# Patient Record
Sex: FEMALE | Race: WHITE | NOT HISPANIC OR LATINO | Employment: FULL TIME | URBAN - METROPOLITAN AREA
[De-identification: names, ages, dates, MRNs, and addresses within clinical notes are randomized per-mention and may not be internally consistent; named-entity substitution may affect disease eponyms.]

---

## 2017-02-27 ENCOUNTER — GENERIC CONVERSION - ENCOUNTER (OUTPATIENT)
Dept: OTHER | Facility: OTHER | Age: 22
End: 2017-02-27

## 2017-04-04 ENCOUNTER — ALLSCRIPTS OFFICE VISIT (OUTPATIENT)
Dept: OTHER | Facility: OTHER | Age: 22
End: 2017-04-04

## 2017-08-03 ENCOUNTER — TRANSCRIBE ORDERS (OUTPATIENT)
Dept: ADMINISTRATIVE | Age: 22
End: 2017-08-03

## 2017-08-03 ENCOUNTER — APPOINTMENT (OUTPATIENT)
Dept: LAB | Age: 22
End: 2017-08-03
Payer: COMMERCIAL

## 2017-08-03 DIAGNOSIS — Z01.84 IMMUNITY STATUS TESTING: Primary | ICD-10-CM

## 2017-08-03 DIAGNOSIS — Z01.84 IMMUNITY STATUS TESTING: ICD-10-CM

## 2017-08-03 PROCEDURE — 86706 HEP B SURFACE ANTIBODY: CPT

## 2017-08-03 PROCEDURE — 86787 VARICELLA-ZOSTER ANTIBODY: CPT

## 2017-08-03 PROCEDURE — 36415 COLL VENOUS BLD VENIPUNCTURE: CPT

## 2017-08-03 PROCEDURE — 86765 RUBEOLA ANTIBODY: CPT

## 2017-08-04 LAB — HBV SURFACE AB SER-ACNC: 26.25 MIU/ML

## 2017-08-08 LAB
MEV IGG SER QL: NORMAL
VZV IGG SER IA-ACNC: NORMAL

## 2017-08-15 ENCOUNTER — TRANSCRIBE ORDERS (OUTPATIENT)
Dept: ADMINISTRATIVE | Age: 22
End: 2017-08-15

## 2017-08-15 ENCOUNTER — APPOINTMENT (OUTPATIENT)
Dept: LAB | Age: 22
End: 2017-08-15
Payer: COMMERCIAL

## 2017-08-15 DIAGNOSIS — Z01.84 IMMUNITY STATUS TESTING: ICD-10-CM

## 2017-08-15 DIAGNOSIS — Z01.84 IMMUNITY STATUS TESTING: Primary | ICD-10-CM

## 2017-08-15 PROCEDURE — 36415 COLL VENOUS BLD VENIPUNCTURE: CPT

## 2017-08-15 PROCEDURE — 86765 RUBEOLA ANTIBODY: CPT

## 2017-08-15 PROCEDURE — 86762 RUBELLA ANTIBODY: CPT

## 2017-08-15 PROCEDURE — 86735 MUMPS ANTIBODY: CPT | Performed by: PREVENTIVE MEDICINE

## 2017-08-15 PROCEDURE — 86735 MUMPS ANTIBODY: CPT

## 2017-08-15 PROCEDURE — 86765 RUBEOLA ANTIBODY: CPT | Performed by: PREVENTIVE MEDICINE

## 2017-08-16 LAB — RUBV IGG SERPL IA-ACNC: >175 IU/ML

## 2017-08-17 LAB
MEV IGG SER QL: NORMAL
MUV IGG SER QL: NORMAL

## 2017-08-22 LAB
MISCELLANEOUS LAB TEST RESULT: NORMAL
MISCELLANEOUS LAB TEST RESULT: NORMAL

## 2018-01-11 NOTE — MISCELLANEOUS
Message   Recorded as Task   Date: 02/25/2017 11:37 AM, Created By: System   Task Name: Rx Renew Request   Assigned To: Jose Luis Kowalski   Regarding Patient: Kyle Plan, Status: Active   Comment:    System - 25 Feb 2017 11:37 AM     PHARMACY: [id number]  PATIENT: Zelalem Rodriguez  MEDICATION: Romaine 24 FE Oral Tablet 1-20 MG-MCG(24)   Carey Gonzalez - 27 Feb 2017 7:24 AM     TASK REASSIGNED: Previously Assigned To Elizabeth Goodwin - 27 Feb 2017 8:59 AM     TASK EDITED  Rx to ehr        Active Problems    1  Dysmenorrhea (625 3) (N94 6)   2  Encounter for gynecological examination without abnormal finding (V72 31) (Z01 419)    Current Meds   1  Lomedia 24 FE 1-20 MG-MCG(24) Oral Tablet; TAKE ONE TABLET BY MOUTH ONCE   DAILY; Therapy: [id number] to (Evaluate:26Mar2017); Last Rx:31Mar2016 Ordered    Allergies    1  No Known Drug Allergies    Plan  Dysmenorrhea    · Lomedia 24 FE 1-20 MG-MCG(24) Oral Tablet; TAKE ONE TABLET BY MOUTH  ONCE DAILY    Signatures   Electronically signed by :  Char Harrison, ; Feb 27 2017  8:59AM EST                       (Author)

## 2018-01-12 VITALS
DIASTOLIC BLOOD PRESSURE: 70 MMHG | SYSTOLIC BLOOD PRESSURE: 100 MMHG | WEIGHT: 224 LBS | BODY MASS INDEX: 33.18 KG/M2 | HEIGHT: 69 IN

## 2018-04-23 ENCOUNTER — TELEPHONE (OUTPATIENT)
Dept: OBGYN CLINIC | Facility: CLINIC | Age: 23
End: 2018-04-23

## 2018-08-09 ENCOUNTER — ANNUAL EXAM (OUTPATIENT)
Dept: OBGYN CLINIC | Facility: CLINIC | Age: 23
End: 2018-08-09
Payer: COMMERCIAL

## 2018-08-09 VITALS
WEIGHT: 249 LBS | DIASTOLIC BLOOD PRESSURE: 66 MMHG | SYSTOLIC BLOOD PRESSURE: 122 MMHG | BODY MASS INDEX: 36.88 KG/M2 | HEIGHT: 69 IN

## 2018-08-09 DIAGNOSIS — Z01.419 ENCOUNTER FOR GYNECOLOGICAL EXAMINATION WITHOUT ABNORMAL FINDING: Primary | ICD-10-CM

## 2018-08-09 PROCEDURE — 99395 PREV VISIT EST AGE 18-39: CPT | Performed by: PHYSICIAN ASSISTANT

## 2018-08-09 RX ORDER — NORETHINDRONE ACETATE AND ETHINYL ESTRADIOL AND FERROUS FUMARATE 1MG-20(24)
1 KIT ORAL DAILY
COMMUNITY
Start: 2015-11-24 | End: 2018-08-09 | Stop reason: SDUPTHER

## 2018-08-09 RX ORDER — NORETHINDRONE ACETATE AND ETHINYL ESTRADIOL AND FERROUS FUMARATE 1MG-20(24)
1 KIT ORAL DAILY
Qty: 90 TABLET | Refills: 3 | Status: SHIPPED | OUTPATIENT
Start: 2018-08-09 | End: 2019-08-13 | Stop reason: SDUPTHER

## 2018-08-09 NOTE — PROGRESS NOTES
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  Last Pap  2016 neg      Current Outpatient Prescriptions:     norethindrone-ethinyl estradiol-ferrous fumarate (LOMEDIA 24 FE) 1-20 MG-MCG(24) per tablet, Take 1 tablet by mouth daily, Disp: , Rfl:   Social History     Social History    Marital status: Single     Spouse name: N/A    Number of children: N/A    Years of education: N/A     Occupational History    Not on file  Social History Main Topics    Smoking status: Never Smoker    Smokeless tobacco: Never Used    Alcohol use Yes      Comment: Social     Drug use: No    Sexual activity: No     Other Topics Concern    Not on file     Social History Narrative    Denied history of daily caffeinated coffee consumption    Exercises strenuously less than 3 times weekly         Family History   Problem Relation Age of Onset    Hyperthyroidism Mother     No Known Problems Father      Past Medical History:   Diagnosis Date    Known health problems: none          O:  Blood pressure 122/66, height 5' 8 5" (1 74 m), weight 113 kg (249 lb), last menstrual period 08/02/2018  Patient appears well and is not in distress  Neck is supple without masses  Breasts are symmetrical without mass, tenderness, nipple discharge, skin changes or adenopathy  Abdomen is soft and nontender without masses  External genitals are normal without lesions or rashes  Vagina is normal without discharge or bleeding  Cervix is normal without discharge or lesion  Uterus is normal, mobile, nontender without palpable mass  Adnexa are normal, nontender, without palpable mass  A:  Yearly exam      P:   Nicki Caraballo 24 sent to pharmacy   Pap 2019    RTO one year for yearly exam or sooner as needed

## 2019-08-13 ENCOUNTER — ANNUAL EXAM (OUTPATIENT)
Dept: OBGYN CLINIC | Facility: CLINIC | Age: 24
End: 2019-08-13
Payer: COMMERCIAL

## 2019-08-13 VITALS
BODY MASS INDEX: 35.31 KG/M2 | WEIGHT: 238.4 LBS | HEIGHT: 69 IN | DIASTOLIC BLOOD PRESSURE: 80 MMHG | SYSTOLIC BLOOD PRESSURE: 122 MMHG

## 2019-08-13 DIAGNOSIS — Z01.419 ENCOUNTER FOR GYNECOLOGICAL EXAMINATION WITHOUT ABNORMAL FINDING: ICD-10-CM

## 2019-08-13 DIAGNOSIS — Z01.419 ENCNTR FOR GYN EXAM (GENERAL) (ROUTINE) W/O ABN FINDINGS: Primary | ICD-10-CM

## 2019-08-13 PROCEDURE — 87624 HPV HI-RISK TYP POOLED RSLT: CPT | Performed by: PHYSICIAN ASSISTANT

## 2019-08-13 PROCEDURE — 99395 PREV VISIT EST AGE 18-39: CPT | Performed by: PHYSICIAN ASSISTANT

## 2019-08-13 PROCEDURE — G0145 SCR C/V CYTO,THINLAYER,RESCR: HCPCS | Performed by: PATHOLOGY

## 2019-08-13 PROCEDURE — G0124 SCREEN C/V THIN LAYER BY MD: HCPCS | Performed by: PATHOLOGY

## 2019-08-13 RX ORDER — NORETHINDRONE ACETATE AND ETHINYL ESTRADIOL AND FERROUS FUMARATE 1MG-20(24)
1 KIT ORAL DAILY
Qty: 90 TABLET | Refills: 3 | Status: SHIPPED | OUTPATIENT
Start: 2019-08-13 | End: 2020-09-15 | Stop reason: SDUPTHER

## 2019-08-13 NOTE — PROGRESS NOTES
Robert Hem  1995    CC:  Yearly exam    S:  20 y o  female here for yearly exam  Her cycles are regular, not heavy or crampy  She has never been sexually active  She uses Lomedia 24 for cycle control  She does not request STD testing today  We reviewed [facility] guidelines for Pap testing       Last Pap 2016 neg    Family hx of breast cancer: no  Family hx of ovarian cancer:  no  Family hx of colon cancer: no      Current Outpatient Medications:     norethindrone-ethinyl estradiol-ferrous fumarate (LOMEDIA 24 FE) 1-20 MG-MCG(24) per tablet, Take 1 tablet by mouth daily, Disp: 90 tablet, Rfl: 3  Social History     Socioeconomic History    Marital status: Single     Spouse name: Not on file    Number of children: Not on file    Years of education: Not on file    Highest education level: Not on file   Occupational History    Not on file   Social Needs    Financial resource strain: Not on file    Food insecurity:     Worry: Not on file     Inability: Not on file    Transportation needs:     Medical: Not on file     Non-medical: Not on file   Tobacco Use    Smoking status: Never Smoker    Smokeless tobacco: Never Used   Substance and Sexual Activity    Alcohol use: Yes     Comment: Social     Drug use: No    Sexual activity: Never     Birth control/protection: Pill   Lifestyle    Physical activity:     Days per week: Not on file     Minutes per session: Not on file    Stress: Not on file   Relationships    Social connections:     Talks on phone: Not on file     Gets together: Not on file     Attends Pentecostalism service: Not on file     Active member of club or organization: Not on file     Attends meetings of clubs or organizations: Not on file     Relationship status: Not on file    Intimate partner violence:     Fear of current or ex partner: Not on file     Emotionally abused: Not on file     Physically abused: Not on file     Forced sexual activity: Not on file   Other Topics Concern    Not on file   Social History Narrative    Denied history of daily caffeinated coffee consumption    Exercises strenuously less than 3 times weekly     Family History   Problem Relation Age of Onset    Hyperthyroidism Mother     No Known Problems Father      Past Medical History:   Diagnosis Date    Known health problems: none        Review of Systems   Respiratory: Negative  Cardiovascular: Negative  Gastrointestinal: Negative for constipation and diarrhea  Genitourinary: Negative for difficulty urinating, pelvic pain, vaginal bleeding, vaginal discharge, itching or odor  O:  Blood pressure 122/80, height 5' 9" (1 753 m), weight 108 kg (238 lb 6 4 oz), last menstrual period 08/06/2019  Patient appears well and is not in distress  Neck is supple without masses  Breasts are symmetrical without mass, tenderness, nipple discharge, skin changes or adenopathy  Abdomen is soft and nontender without masses  External genitals are normal without lesions or rashes  Urethra and urethral meatus are normal  Bladder is normal to palpation  Vagina is normal without discharge or bleeding  Cervix is normal without discharge or lesion  Uterus is normal, mobile, nontender without palpable mass  Adnexa are normal, nontender, without palpable mass  A:  Yearly exam      P:   Pap with reflex HPV today    [facility] 24 sent to pharmacy     RTO one year for yearly exam or sooner as needed

## 2019-08-18 LAB
LAB AP GYN PRIMARY INTERPRETATION: ABNORMAL
Lab: ABNORMAL
PATH INTERP SPEC-IMP: ABNORMAL

## 2019-08-19 LAB
HPV HR 12 DNA CVX QL NAA+PROBE: NEGATIVE
HPV16 DNA CVX QL NAA+PROBE: NEGATIVE
HPV18 DNA CVX QL NAA+PROBE: NEGATIVE

## 2019-08-20 ENCOUNTER — TELEPHONE (OUTPATIENT)
Dept: OBGYN CLINIC | Facility: CLINIC | Age: 24
End: 2019-08-20

## 2019-08-20 NOTE — TELEPHONE ENCOUNTER
----- Message from Travis Medley PA-C sent at 8/20/2019  9:35 AM EDT -----  Pap shows atypical cells but HPV test is negative - this is considered a normal Pap  Will repeat her Pap in 3 years

## 2019-08-23 ENCOUNTER — APPOINTMENT (OUTPATIENT)
Dept: LAB | Age: 24
End: 2019-08-23
Attending: PREVENTIVE MEDICINE

## 2019-08-23 ENCOUNTER — TRANSCRIBE ORDERS (OUTPATIENT)
Dept: ADMINISTRATIVE | Age: 24
End: 2019-08-23

## 2019-08-23 DIAGNOSIS — Z02.1 PHYSICAL EXAM, PRE-EMPLOYMENT: Primary | ICD-10-CM

## 2019-08-23 DIAGNOSIS — Z02.1 PHYSICAL EXAM, PRE-EMPLOYMENT: ICD-10-CM

## 2019-08-23 PROCEDURE — 36415 COLL VENOUS BLD VENIPUNCTURE: CPT

## 2019-08-23 PROCEDURE — 86480 TB TEST CELL IMMUN MEASURE: CPT

## 2019-08-26 LAB
GAMMA INTERFERON BACKGROUND BLD IA-ACNC: 0.03 IU/ML
M TB IFN-G BLD-IMP: NEGATIVE
M TB IFN-G CD4+ BCKGRND COR BLD-ACNC: -0.01 IU/ML
M TB IFN-G CD4+ BCKGRND COR BLD-ACNC: 0 IU/ML
MITOGEN IGNF BCKGRD COR BLD-ACNC: >10 IU/ML

## 2020-03-19 ENCOUNTER — APPOINTMENT (OUTPATIENT)
Dept: LAB | Facility: HOSPITAL | Age: 25
End: 2020-03-19
Payer: COMMERCIAL

## 2020-03-19 ENCOUNTER — TRANSCRIBE ORDERS (OUTPATIENT)
Dept: LAB | Facility: HOSPITAL | Age: 25
End: 2020-03-19

## 2020-03-19 DIAGNOSIS — Z00.8 HEALTH EXAMINATION IN POPULATION SURVEY: Primary | ICD-10-CM

## 2020-03-19 DIAGNOSIS — Z00.8 HEALTH EXAMINATION IN POPULATION SURVEY: ICD-10-CM

## 2020-03-19 LAB
CHOLEST SERPL-MCNC: 151 MG/DL (ref 50–200)
EST. AVERAGE GLUCOSE BLD GHB EST-MCNC: 111 MG/DL
HBA1C MFR BLD: 5.5 %
HDLC SERPL-MCNC: 46 MG/DL
LDLC SERPL CALC-MCNC: 74 MG/DL (ref 0–100)
NONHDLC SERPL-MCNC: 105 MG/DL
TRIGL SERPL-MCNC: 155 MG/DL

## 2020-03-19 PROCEDURE — 36415 COLL VENOUS BLD VENIPUNCTURE: CPT

## 2020-03-19 PROCEDURE — 83036 HEMOGLOBIN GLYCOSYLATED A1C: CPT

## 2020-03-19 PROCEDURE — 80061 LIPID PANEL: CPT

## 2020-09-15 ENCOUNTER — ANNUAL EXAM (OUTPATIENT)
Dept: OBGYN CLINIC | Facility: CLINIC | Age: 25
End: 2020-09-15
Payer: COMMERCIAL

## 2020-09-15 VITALS
DIASTOLIC BLOOD PRESSURE: 80 MMHG | SYSTOLIC BLOOD PRESSURE: 120 MMHG | HEIGHT: 69 IN | TEMPERATURE: 99.2 F | BODY MASS INDEX: 34.36 KG/M2 | WEIGHT: 232 LBS

## 2020-09-15 DIAGNOSIS — Z01.419 ENCOUNTER FOR GYNECOLOGICAL EXAMINATION WITHOUT ABNORMAL FINDING: ICD-10-CM

## 2020-09-15 DIAGNOSIS — Z01.419 ENCNTR FOR GYN EXAM (GENERAL) (ROUTINE) W/O ABN FINDINGS: Primary | ICD-10-CM

## 2020-09-15 PROCEDURE — 99395 PREV VISIT EST AGE 18-39: CPT | Performed by: PHYSICIAN ASSISTANT

## 2020-09-15 NOTE — PROGRESS NOTES
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[employer]/[place]      We reviewed [facility] guidelines for Pap testing       Last Pap 8/13/19 ASCUS neg HPV     Family hx of breast cancer: no  Family hx of ovarian cancer: no  Family hx of colon cancer: no      Current Outpatient Medications:     norethindrone-ethinyl estradiol-ferrous fumarate (LOMEDIA 24 FE) 1-20 MG-MCG(24) per tablet, Take 1 tablet by mouth daily, Disp: 90 tablet, Rfl: 3  Social History     Socioeconomic History    Marital status: Single     Spouse name: Not on file    Number of children: Not on file    Years of education: Not on file    Highest education level: Not on file   Occupational History    Not on file   Social Needs    Financial resource strain: Not on file    Food insecurity     Worry: Not on file     Inability: Not on file    Transportation needs     Medical: Not on file     Non-medical: Not on file   Tobacco Use    Smoking status: Never Smoker    Smokeless tobacco: Never Used   Substance and Sexual Activity    Alcohol use: Yes     Frequency: Monthly or less     Drinks per session: 1 or 2     Binge frequency: Never     Comment: Social     Drug use: No    Sexual activity: Never     Birth control/protection: Pill   Lifestyle    Physical activity     Days per week: Not on file     Minutes per session: Not on file    Stress: Not on file   Relationships    Social connections     Talks on phone: Not on file     Gets together: Not on file     Attends Nondenominational service: Not on file     Active member of club or organization: Not on file Attends meetings of clubs or organizations: Not on file     Relationship status: Not on file    Intimate partner violence     Fear of current or ex partner: Not on file     Emotionally abused: Not on file     Physically abused: Not on file     Forced sexual activity: Not on file   Other Topics Concern    Not on file   Social History Narrative    Denied history of daily caffeinated coffee consumption    Exercises strenuously less than 3 times weekly     Family History   Problem Relation Age of Onset    Hyperthyroidism Mother     No Known Problems Father      Past Medical History:   Diagnosis Date    Known health problems: none        Review of Systems   Respiratory: Negative  Cardiovascular: Negative  Gastrointestinal: Negative for constipation and diarrhea  Genitourinary: Negative for difficulty urinating, pelvic pain, vaginal bleeding, vaginal discharge, itching or odor  O:  Blood pressure 120/80, temperature 99 2 °F (37 3 °C), temperature source Tympanic, height 5' 8 5" (1 74 m), weight 105 kg (232 lb), last menstrual period 09/05/2020  Patient appears well and is not in distress  Neck is supple without masses  Breasts are symmetrical without mass, tenderness, nipple discharge, skin changes or adenopathy  Abdomen is soft and nontender without masses  Pelvic exam deferred - asymptomatic, never s/a    A:  Yearly exam      P:   Pap 2022   Lomedia 24 sent to pharmacy     RTO one year for yearly exam or sooner as needed

## 2020-11-23 ENCOUNTER — TELEPHONE (OUTPATIENT)
Dept: FAMILY MEDICINE CLINIC | Facility: CLINIC | Age: 25
End: 2020-11-23

## 2020-12-18 ENCOUNTER — IMMUNIZATIONS (OUTPATIENT)
Dept: FAMILY MEDICINE CLINIC | Facility: HOSPITAL | Age: 25
End: 2020-12-18
Payer: COMMERCIAL

## 2020-12-18 DIAGNOSIS — Z23 ENCOUNTER FOR IMMUNIZATION: ICD-10-CM

## 2020-12-18 PROCEDURE — 91300 SARS-COV-2 / COVID-19 MRNA VACCINE (PFIZER-BIONTECH) 30 MCG: CPT

## 2020-12-18 PROCEDURE — 0001A SARS-COV-2 / COVID-19 MRNA VACCINE (PFIZER-BIONTECH) 30 MCG: CPT

## 2021-01-06 ENCOUNTER — TELEPHONE (OUTPATIENT)
Dept: INTERNAL MEDICINE CLINIC | Facility: CLINIC | Age: 26
End: 2021-01-06

## 2021-01-07 ENCOUNTER — IMMUNIZATIONS (OUTPATIENT)
Dept: FAMILY MEDICINE CLINIC | Facility: HOSPITAL | Age: 26
End: 2021-01-07

## 2021-01-07 DIAGNOSIS — Z23 ENCOUNTER FOR IMMUNIZATION: ICD-10-CM

## 2021-01-07 PROCEDURE — 0002A SARS-COV-2 / COVID-19 MRNA VACCINE (PFIZER-BIONTECH) 30 MCG: CPT

## 2021-01-07 PROCEDURE — 91300 SARS-COV-2 / COVID-19 MRNA VACCINE (PFIZER-BIONTECH) 30 MCG: CPT

## 2021-01-20 ENCOUNTER — OFFICE VISIT (OUTPATIENT)
Dept: INTERNAL MEDICINE CLINIC | Facility: CLINIC | Age: 26
End: 2021-01-20
Payer: COMMERCIAL

## 2021-01-20 VITALS
OXYGEN SATURATION: 99 % | DIASTOLIC BLOOD PRESSURE: 84 MMHG | SYSTOLIC BLOOD PRESSURE: 130 MMHG | BODY MASS INDEX: 35.25 KG/M2 | TEMPERATURE: 96.2 F | WEIGHT: 238 LBS | HEART RATE: 82 BPM | HEIGHT: 69 IN

## 2021-01-20 DIAGNOSIS — F41.9 ANXIETY: ICD-10-CM

## 2021-01-20 DIAGNOSIS — L29.9 ITCHY SCALP: ICD-10-CM

## 2021-01-20 DIAGNOSIS — L65.9 HAIR LOSS: ICD-10-CM

## 2021-01-20 DIAGNOSIS — Z00.00 ANNUAL PHYSICAL EXAM: Primary | ICD-10-CM

## 2021-01-20 PROCEDURE — 99385 PREV VISIT NEW AGE 18-39: CPT | Performed by: NURSE PRACTITIONER

## 2021-01-20 RX ORDER — KETOCONAZOLE 20 MG/ML
1 SHAMPOO TOPICAL 2 TIMES WEEKLY
Qty: 120 ML | Refills: 1 | Status: SHIPPED | OUTPATIENT
Start: 2021-01-21 | End: 2021-12-08 | Stop reason: SDUPTHER

## 2021-01-20 NOTE — PROGRESS NOTES
Assessment/Plan:    No problem-specific Assessment & Plan notes found for this encounter  There are no diagnoses linked to this encounter  Subjective:      Patient ID: Clark Mayer is a 30 y o  female      Here today for [facility] care    Wants to see derm   Hair loss, over the last few years, over the last 6 months worse         The following portions of the patient's history were reviewed and updated as appropriate: allergies, current medications, past family history, past medical history, past social history, past surgical history and problem list     Review of Systems      Objective:      /84   Pulse 82   Temp (!) 96 2 °F (35 7 °C)   Ht 5' 8 5" (1 74 m)   Wt 108 kg (238 lb)   SpO2 99%   BMI 35 66 kg/m²          Physical Exam

## 2021-01-20 NOTE — PROGRESS NOTES
[address] INTERNAL MEDICINE    NAME: Gopi Coe  AGE: 30 y o  SEX: female  : 1995     DATE: 2021     Assessment and Plan:     Problem List Items Addressed This Visit        Other    Anxiety     Stable  In counseling  Has not been on medication in the past             Other Visit Diagnoses     Annual physical exam    -  Primary    Relevant Orders    Comprehensive metabolic panel    Lipid Panel with Direct LDL reflex    Itchy scalp        Relevant Medications    ketoconazole (NIZORAL) 2 % shampoo (Start on 2021)    Other Relevant Orders    Ambulatory referral to Dermatology    Hair loss        check labs    Relevant Orders    Ambulatory referral to Dermatology    TSH, 3rd generation with Free T4 reflex    CBC and differential          Immunizations and preventive care screenings were discussed with patient today  Appropriate education was printed on patient's after visit summary  Counseling:  Injury prevention: discussed safety/seat belts, safety helmets, smoke detectors, carbon dioxide detectors, and smoking near bedding or upholstery  · Exercise: the importance of regular exercise/physical activity was discussed  Recommend exercise 3-5 times per week for at least 30 minutes  BMI Counseling: Body mass index is 35 66 kg/m²  The BMI is above normal  Nutrition recommendations include decreasing portion sizes and encouraging healthy choices of fruits and vegetables  Exercise recommendations include exercising 3-5 times per week  Return in about 1 year (around 2022) for Next scheduled follow up       Chief Complaint:     Chief Complaint   Patient presents with    SLP Initial Evaluation     est, hair loss x3 years       History of Present Illness:     Adult Annual Physical   Patient here for a comprehensive physical exam  The patient reports problems - Hair loss over the last few years, worse over the last 10 months  occasional lightheadedness over the last few years  Very prone to motion sickness     She has a history of psoriasis of her scalp  She has not seen dermatology  Recently her scalp has been more itchy  Diet and Physical Activity  · Diet/Nutrition: well balanced diet  · Exercise: no formal exercise and rides horses   Depression Screening  PHQ-9 Depression Screening    PHQ-9:   Frequency of the following problems over the past two weeks:      Little interest or pleasure in doing things: 0 - not at all  Feeling down, depressed, or hopeless: 0 - not at all  PHQ-2 Score: 0       General Health  · Sleep: sleeps well  · Hearing: normal - none   · Vision: goes for regular eye exams, wears glasses and wears contacts  · Dental: regular dental visits  /GYN Health  · Last menstrual period: regular   · Contraceptive method: oral contraceptives  · Last pap: 2019, normal      Review of Systems:     Review of Systems   Constitutional: Negative for activity change, appetite change, fatigue and unexpected weight change  Eyes: Negative for visual disturbance  Respiratory: Negative for cough, chest tightness and shortness of breath  Cardiovascular: Negative for chest pain, palpitations and leg swelling  Gastrointestinal: Negative for abdominal pain, constipation and diarrhea  Endocrine:        Hair loss    Genitourinary: Negative for difficulty urinating  Musculoskeletal: Negative for arthralgias  Skin: Negative for rash  Itchy scalp   Neurological: Positive for light-headedness  Negative for dizziness, weakness and headaches  Psychiatric/Behavioral: Negative for sleep disturbance  Past Medical History:     Past Medical History:   Diagnosis Date    Allergic     Anxiety     Known health problems: none       Past Surgical History:     History reviewed  No pertinent surgical history     Social History:     E-Cigarette/Vaping    E-Cigarette Use Never User E-Cigarette/Vaping Substances    Nicotine No     THC No     CBD No     Flavoring No     Other No     Unknown No      Social History     Socioeconomic History    Marital status: Single     Spouse name: None    Number of children: None    Years of education: None    Highest education level: None   Occupational History    None   Social Needs    Financial resource strain: None    Food insecurity     Worry: None     Inability: None    Transportation needs     Medical: None     Non-medical: None   Tobacco Use    Smoking status: Never Smoker    Smokeless tobacco: Never Used   Substance and Sexual Activity    Alcohol use: Yes     Frequency: Monthly or less     Drinks per session: 1 or 2     Binge frequency: Never     Comment: Social     Drug use: No    Sexual activity: Never     Birth control/protection: Pill   Lifestyle    Physical activity     Days per week: None     Minutes per session: None    Stress: None   Relationships    Social connections     Talks on phone: None     Gets together: None     Attends Mu-ism service: None     Active member of club or organization: None     Attends meetings of clubs or organizations: None     Relationship status: None    Intimate partner violence     Fear of current or ex partner: None     Emotionally abused: None     Physically abused: None     Forced sexual activity: None   Other Topics Concern    None   Social History Narrative    Denied history of daily caffeinated coffee consumption    Exercises strenuously less than 3 times weekly      Family History:     Family History   Problem Relation Age of Onset    Hyperthyroidism Mother         when pregnant     No Known Problems Father     Cancer Maternal Grandmother         uterine     Cancer Maternal Aunt         melanoma    Alcohol abuse Neg Hx     Substance Abuse Neg Hx     Mental illness Neg Hx     Depression Neg Hx       Current Medications:     Current Outpatient Medications   Medication Sig Dispense Refill    [START ON 1/21/2021] ketoconazole (NIZORAL) 2 % shampoo Apply 1 application topically 2 (two) times a week 120 mL 1    norethindrone-ethinyl estradiol-ferrous fumarate (Lomedia 24 FE) 1-20 MG-MCG(24) per tablet Take 1 tablet by mouth daily 90 tablet 3     No current facility-administered medications for this visit  Allergies:     No Known Allergies   Physical Exam:     /84   Pulse 82   Temp (!) 96 2 °F (35 7 °C)   Ht 5' 8 5" (1 74 m)   Wt 108 kg (238 lb)   SpO2 99%   BMI 35 66 kg/m²     Physical Exam  Vitals signs reviewed  Constitutional:       General: She is not in acute distress  Appearance: Normal appearance  HENT:      Head: Normocephalic and atraumatic  Right Ear: Tympanic membrane, ear canal and external ear normal       Left Ear: Tympanic membrane, ear canal and external ear normal    Eyes:      Conjunctiva/sclera: Conjunctivae normal       Pupils: Pupils are equal, round, and reactive to light  Neck:      Musculoskeletal: Normal range of motion  Cardiovascular:      Rate and Rhythm: Normal rate and regular rhythm  Pulses: Normal pulses  Heart sounds: Normal heart sounds  Pulmonary:      Effort: Pulmonary effort is normal       Breath sounds: Normal breath sounds  Abdominal:      General: Bowel sounds are normal       Palpations: Abdomen is soft  Musculoskeletal: Normal range of motion  General: No swelling  Lymphadenopathy:      Cervical: No cervical adenopathy  Skin:     General: Skin is warm and dry  Neurological:      Mental Status: She is alert and oriented to person, place, and time     Psychiatric:         Mood and Affect: Mood normal          Behavior: Behavior normal           [facility] [address] INTERNAL MEDICINE

## 2021-01-26 ENCOUNTER — LAB (OUTPATIENT)
Dept: LAB | Age: 26
End: 2021-01-26
Payer: COMMERCIAL

## 2021-01-26 DIAGNOSIS — Z00.00 ANNUAL PHYSICAL EXAM: ICD-10-CM

## 2021-01-26 DIAGNOSIS — L65.9 HAIR LOSS: ICD-10-CM

## 2021-01-26 DIAGNOSIS — F41.9 ANXIETY: Primary | ICD-10-CM

## 2021-01-26 LAB
ALBUMIN SERPL BCP-MCNC: 3.5 G/DL (ref 3.5–5)
ALP SERPL-CCNC: 78 U/L (ref 46–116)
ALT SERPL W P-5'-P-CCNC: 23 U/L (ref 12–78)
ANION GAP SERPL CALCULATED.3IONS-SCNC: 2 MMOL/L (ref 4–13)
AST SERPL W P-5'-P-CCNC: 17 U/L (ref 5–45)
BASOPHILS # BLD AUTO: 0.09 THOUSANDS/ΜL (ref 0–0.1)
BASOPHILS NFR BLD AUTO: 2 % (ref 0–1)
BILIRUB SERPL-MCNC: 0.35 MG/DL (ref 0.2–1)
BUN SERPL-MCNC: 11 MG/DL (ref 5–25)
CALCIUM SERPL-MCNC: 8.8 MG/DL (ref 8.3–10.1)
CHLORIDE SERPL-SCNC: 105 MMOL/L (ref 100–108)
CHOLEST SERPL-MCNC: 169 MG/DL (ref 50–200)
CO2 SERPL-SCNC: 31 MMOL/L (ref 21–32)
CREAT SERPL-MCNC: 0.79 MG/DL (ref 0.6–1.3)
EOSINOPHIL # BLD AUTO: 0.29 THOUSAND/ΜL (ref 0–0.61)
EOSINOPHIL NFR BLD AUTO: 5 % (ref 0–6)
ERYTHROCYTE [DISTWIDTH] IN BLOOD BY AUTOMATED COUNT: 13.1 % (ref 11.6–15.1)
GFR SERPL CREATININE-BSD FRML MDRD: 103 ML/MIN/1.73SQ M
GLUCOSE P FAST SERPL-MCNC: 93 MG/DL (ref 65–99)
HCT VFR BLD AUTO: 39.7 % (ref 34.8–46.1)
HDLC SERPL-MCNC: 53 MG/DL
HGB BLD-MCNC: 12.7 G/DL (ref 11.5–15.4)
IMM GRANULOCYTES # BLD AUTO: 0.01 THOUSAND/UL (ref 0–0.2)
IMM GRANULOCYTES NFR BLD AUTO: 0 % (ref 0–2)
LDLC SERPL CALC-MCNC: 86 MG/DL (ref 0–100)
LYMPHOCYTES # BLD AUTO: 1.76 THOUSANDS/ΜL (ref 0.6–4.47)
LYMPHOCYTES NFR BLD AUTO: 30 % (ref 14–44)
MCH RBC QN AUTO: 28.5 PG (ref 26.8–34.3)
MCHC RBC AUTO-ENTMCNC: 32 G/DL (ref 31.4–37.4)
MCV RBC AUTO: 89 FL (ref 82–98)
MONOCYTES # BLD AUTO: 0.37 THOUSAND/ΜL (ref 0.17–1.22)
MONOCYTES NFR BLD AUTO: 6 % (ref 4–12)
NEUTROPHILS # BLD AUTO: 3.42 THOUSANDS/ΜL (ref 1.85–7.62)
NEUTS SEG NFR BLD AUTO: 57 % (ref 43–75)
NRBC BLD AUTO-RTO: 0 /100 WBCS
PLATELET # BLD AUTO: 362 THOUSANDS/UL (ref 149–390)
PMV BLD AUTO: 9.5 FL (ref 8.9–12.7)
POTASSIUM SERPL-SCNC: 4.5 MMOL/L (ref 3.5–5.3)
PROT SERPL-MCNC: 7.5 G/DL (ref 6.4–8.2)
RBC # BLD AUTO: 4.46 MILLION/UL (ref 3.81–5.12)
SODIUM SERPL-SCNC: 138 MMOL/L (ref 136–145)
TRIGL SERPL-MCNC: 149 MG/DL
TSH SERPL DL<=0.05 MIU/L-ACNC: 2.79 UIU/ML (ref 0.36–3.74)
WBC # BLD AUTO: 5.94 THOUSAND/UL (ref 4.31–10.16)

## 2021-01-26 PROCEDURE — 84443 ASSAY THYROID STIM HORMONE: CPT

## 2021-01-26 PROCEDURE — 85025 COMPLETE CBC W/AUTO DIFF WBC: CPT

## 2021-01-26 PROCEDURE — 80053 COMPREHEN METABOLIC PANEL: CPT

## 2021-01-26 PROCEDURE — 80061 LIPID PANEL: CPT

## 2021-01-26 PROCEDURE — 36415 COLL VENOUS BLD VENIPUNCTURE: CPT

## 2021-01-26 RX ORDER — ESCITALOPRAM OXALATE 5 MG/1
5 TABLET ORAL DAILY
Qty: 30 TABLET | Refills: 0 | Status: SHIPPED | OUTPATIENT
Start: 2021-01-26 | End: 2021-02-25 | Stop reason: SDUPTHER

## 2021-02-25 ENCOUNTER — OFFICE VISIT (OUTPATIENT)
Dept: INTERNAL MEDICINE CLINIC | Facility: CLINIC | Age: 26
End: 2021-02-25
Payer: COMMERCIAL

## 2021-02-25 VITALS
TEMPERATURE: 97 F | DIASTOLIC BLOOD PRESSURE: 82 MMHG | BODY MASS INDEX: 35.01 KG/M2 | WEIGHT: 236.4 LBS | HEART RATE: 82 BPM | SYSTOLIC BLOOD PRESSURE: 118 MMHG | OXYGEN SATURATION: 99 % | HEIGHT: 69 IN

## 2021-02-25 DIAGNOSIS — F41.9 ANXIETY: ICD-10-CM

## 2021-02-25 PROCEDURE — 99213 OFFICE O/P EST LOW 20 MIN: CPT | Performed by: NURSE PRACTITIONER

## 2021-02-25 RX ORDER — ESCITALOPRAM OXALATE 10 MG/1
10 TABLET ORAL DAILY
Qty: 30 TABLET | Refills: 0 | Status: SHIPPED | OUTPATIENT
Start: 2021-02-25 | End: 2021-03-25 | Stop reason: SDUPTHER

## 2021-02-25 NOTE — PROGRESS NOTES
Assessment/Plan:    Anxiety  Increase lexapro to 10 mg daily  Continue counseling  Reevaluate in 1 month  Diagnoses and all orders for this visit:    Anxiety  -     escitalopram (LEXAPRO) 10 mg tablet; Take 1 tablet (10 mg total) by mouth daily          Subjective:      Patient ID: Jony Gibbs is a 30 y o  female  Here today for follow up anxiety   Priscilla Upton was started on lexapro 1 month ago  She had mild headache and nausea in the beginning which have passed  Her anxiety has somewhat improve although she still has some anxiety  She has continued in counseling every other week  The following portions of the patient's history were reviewed and updated as appropriate: allergies, current medications, past family history, past medical history, past social history, past surgical history and problem list     Review of Systems   Constitutional: Negative for activity change, appetite change and fatigue  Respiratory: Negative for shortness of breath  Cardiovascular: Negative for chest pain  Gastrointestinal: Negative for abdominal pain, diarrhea, nausea and vomiting  Neurological: Negative for dizziness, light-headedness and headaches  Psychiatric/Behavioral: Negative for decreased concentration, dysphoric mood, sleep disturbance and suicidal ideas  The patient is nervous/anxious  Objective:      /82   Pulse 82   Temp (!) 97 °F (36 1 °C)   Ht 5' 8 5" (1 74 m)   Wt 107 kg (236 lb 6 4 oz)   SpO2 99%   BMI 35 42 kg/m²          Physical Exam  Vitals signs reviewed  Constitutional:       Appearance: Normal appearance  HENT:      Head: Normocephalic and atraumatic  Eyes:      Conjunctiva/sclera: Conjunctivae normal       Pupils: Pupils are equal, round, and reactive to light  Pulmonary:      Effort: Pulmonary effort is normal    Skin:     General: Skin is warm and dry  Neurological:      Mental Status: She is alert and oriented to person, place, and time  Psychiatric:         Mood and Affect: Mood normal          Behavior: Behavior normal

## 2021-03-25 ENCOUNTER — TELEMEDICINE (OUTPATIENT)
Dept: INTERNAL MEDICINE CLINIC | Facility: CLINIC | Age: 26
End: 2021-03-25
Payer: COMMERCIAL

## 2021-03-25 DIAGNOSIS — F41.9 ANXIETY: Primary | ICD-10-CM

## 2021-03-25 DIAGNOSIS — F41.9 ANXIETY: ICD-10-CM

## 2021-03-25 PROCEDURE — 99213 OFFICE O/P EST LOW 20 MIN: CPT | Performed by: NURSE PRACTITIONER

## 2021-03-26 RX ORDER — ESCITALOPRAM OXALATE 10 MG/1
10 TABLET ORAL DAILY
Qty: 90 TABLET | Refills: 0 | Status: SHIPPED | OUTPATIENT
Start: 2021-03-26 | End: 2021-06-25 | Stop reason: SDUPTHER

## 2021-06-25 DIAGNOSIS — F41.9 ANXIETY: ICD-10-CM

## 2021-06-28 RX ORDER — ESCITALOPRAM OXALATE 10 MG/1
10 TABLET ORAL DAILY
Qty: 90 TABLET | Refills: 0 | Status: SHIPPED | OUTPATIENT
Start: 2021-06-28 | End: 2021-09-23 | Stop reason: SDUPTHER

## 2021-09-08 ENCOUNTER — APPOINTMENT (OUTPATIENT)
Dept: LAB | Facility: HOSPITAL | Age: 26
End: 2021-09-08

## 2021-09-08 DIAGNOSIS — Z00.8 ENCOUNTER FOR OTHER GENERAL EXAMINATION: ICD-10-CM

## 2021-09-08 LAB
CHOLEST SERPL-MCNC: 165 MG/DL (ref 50–200)
EST. AVERAGE GLUCOSE BLD GHB EST-MCNC: 117 MG/DL
HBA1C MFR BLD: 5.7 %
HDLC SERPL-MCNC: 50 MG/DL
LDLC SERPL CALC-MCNC: 74 MG/DL (ref 0–100)
NONHDLC SERPL-MCNC: 115 MG/DL
TRIGL SERPL-MCNC: 205 MG/DL

## 2021-09-08 PROCEDURE — 83036 HEMOGLOBIN GLYCOSYLATED A1C: CPT

## 2021-09-08 PROCEDURE — 80061 LIPID PANEL: CPT

## 2021-09-08 PROCEDURE — 36415 COLL VENOUS BLD VENIPUNCTURE: CPT

## 2021-09-23 DIAGNOSIS — F41.9 ANXIETY: ICD-10-CM

## 2021-09-23 RX ORDER — ESCITALOPRAM OXALATE 10 MG/1
10 TABLET ORAL DAILY
Qty: 90 TABLET | Refills: 0 | Status: SHIPPED | OUTPATIENT
Start: 2021-09-23 | End: 2021-12-27 | Stop reason: SDUPTHER

## 2021-09-27 ENCOUNTER — TELEMEDICINE (OUTPATIENT)
Dept: INTERNAL MEDICINE CLINIC | Facility: CLINIC | Age: 26
End: 2021-09-27
Payer: COMMERCIAL

## 2021-09-27 DIAGNOSIS — F41.9 ANXIETY: Primary | ICD-10-CM

## 2021-09-27 PROCEDURE — 99213 OFFICE O/P EST LOW 20 MIN: CPT | Performed by: NURSE PRACTITIONER

## 2021-09-27 NOTE — PROGRESS NOTES
Virtual Regular Visit    Verification of patient location:    Patient is located in the following state in which I hold an active license [place]      Assessment/Plan:    Problem List Items Addressed This Visit        Other    Anxiety - Primary     Stable  Continue lexapro and counseling  Reason for visit is   Chief Complaint   Patient presents with    Follow-up        Encounter provider PRETTY Robbins    Provider located at [address] [place] [address] [place] [id number]      Recent Visits  No visits were found meeting these conditions  Showing recent visits within past 7 days and meeting all other requirements  Today's Visits  Date Type Provider Dept   09/27/21 [address] Internal Med   Showing today's visits and meeting all other requirements  Future Appointments  No visits were found meeting these conditions  Showing future appointments within next 150 days and meeting all other requirements       The patient was identified by name and date of birth  Lexis Lisa was informed that this is a telemedicine visit and that the visit is being conducted through [address] [place] [id number] Now and patient was informed that this is a secure, HIPAA-compliant platform  She agrees to proceed     My office door was closed  No one else was in the room  She acknowledged consent and understanding of privacy and security of the video platform  The patient has agreed to participate and understands they can discontinue the visit at any time  Patient is aware this is a billable service  Subjective  Lexis Lisa is a 30 y o  female follow up anxiety  Yadira Whitt is doing well on lexapro  She has been stable on current dose for several months  She denies any side effects  Her anxiety is well managed  She is still in counseling as well  She has no new concerns today         Past Medical History:   Diagnosis Date    Allergic     Anxiety     Known health problems: none        History reviewed  No pertinent surgical history  Current Outpatient Medications   Medication Sig Dispense Refill    escitalopram (LEXAPRO) 10 mg tablet Take 1 tablet (10 mg total) by mouth daily 90 tablet 0    ketoconazole (NIZORAL) 2 % shampoo Apply 1 application topically 2 (two) times a week 120 mL 1    norethindrone-ethinyl estradiol-ferrous fumarate (Lomedia 24 FE) 1-20 MG-MCG(24) per tablet Take 1 tablet by mouth daily 90 tablet 3     No current facility-administered medications for this visit  No Known Allergies    Review of Systems   Constitutional: Negative for appetite change and fatigue  Respiratory: Negative for shortness of breath  Cardiovascular: Negative for chest pain  Gastrointestinal: Negative for abdominal pain  Neurological: Negative for dizziness, light-headedness and headaches  Psychiatric/Behavioral: Negative for sleep disturbance and suicidal ideas  The patient is not nervous/anxious  Video Exam    There were no vitals filed for this visit  Physical Exam  Constitutional:       Appearance: She is well-developed  HENT:      Head: Normocephalic  Eyes:      Pupils: Pupils are equal, round, and reactive to light  Pulmonary:      Effort: Pulmonary effort is normal    Neurological:      Mental Status: She is alert and oriented to person, place, and time  Psychiatric:         Behavior: Behavior normal           I spent 5 minutes directly with the patient during this visit    VIRTUAL VISIT DISCLAIMER      Idris Chacon verbally agrees to participate in [employer]  Pt is aware that [employer] could be limited without vital signs or the ability to perform a full hands-on physical Elsycarlos Laurent understands she or the provider may request at any time to terminate the video visit and request the patient to seek care or treatment in person

## 2021-10-16 ENCOUNTER — IMMUNIZATIONS (OUTPATIENT)
Dept: FAMILY MEDICINE CLINIC | Facility: HOSPITAL | Age: 26
End: 2021-10-16

## 2021-10-16 DIAGNOSIS — Z23 ENCOUNTER FOR IMMUNIZATION: Primary | ICD-10-CM

## 2021-10-16 PROCEDURE — 91300 SARS-COV-2 / COVID-19 MRNA VACCINE (PFIZER-BIONTECH) 30 MCG: CPT

## 2021-10-16 PROCEDURE — 0001A SARS-COV-2 / COVID-19 MRNA VACCINE (PFIZER-BIONTECH) 30 MCG: CPT

## 2021-10-20 ENCOUNTER — TELEPHONE (OUTPATIENT)
Dept: INTERNAL MEDICINE CLINIC | Facility: CLINIC | Age: 26
End: 2021-10-20

## 2021-10-25 ENCOUNTER — OFFICE VISIT (OUTPATIENT)
Dept: INTERNAL MEDICINE CLINIC | Facility: CLINIC | Age: 26
End: 2021-10-25
Payer: COMMERCIAL

## 2021-10-25 VITALS
OXYGEN SATURATION: 100 % | TEMPERATURE: 97.1 F | HEIGHT: 69 IN | HEART RATE: 87 BPM | WEIGHT: 251 LBS | BODY MASS INDEX: 37.18 KG/M2 | DIASTOLIC BLOOD PRESSURE: 76 MMHG | SYSTOLIC BLOOD PRESSURE: 110 MMHG

## 2021-10-25 DIAGNOSIS — K92.1 BLOOD IN STOOL: ICD-10-CM

## 2021-10-25 DIAGNOSIS — R10.13 EPIGASTRIC PAIN: Primary | ICD-10-CM

## 2021-10-25 PROCEDURE — 99213 OFFICE O/P EST LOW 20 MIN: CPT | Performed by: NURSE PRACTITIONER

## 2021-10-26 ENCOUNTER — APPOINTMENT (OUTPATIENT)
Dept: LAB | Facility: HOSPITAL | Age: 26
End: 2021-10-26
Payer: COMMERCIAL

## 2021-10-26 DIAGNOSIS — R10.13 EPIGASTRIC PAIN: ICD-10-CM

## 2021-10-26 DIAGNOSIS — K92.1 BLOOD IN STOOL: ICD-10-CM

## 2021-10-26 LAB
ALBUMIN SERPL BCP-MCNC: 3 G/DL (ref 3.5–5)
ALP SERPL-CCNC: 72 U/L (ref 46–116)
ALT SERPL W P-5'-P-CCNC: 21 U/L (ref 12–78)
ANION GAP SERPL CALCULATED.3IONS-SCNC: 5 MMOL/L (ref 4–13)
AST SERPL W P-5'-P-CCNC: 18 U/L (ref 5–45)
BASOPHILS # BLD AUTO: 0.11 THOUSANDS/ΜL (ref 0–0.1)
BASOPHILS NFR BLD AUTO: 1 % (ref 0–1)
BILIRUB SERPL-MCNC: 0.32 MG/DL (ref 0.2–1)
BUN SERPL-MCNC: 10 MG/DL (ref 5–25)
CALCIUM ALBUM COR SERPL-MCNC: 9.7 MG/DL (ref 8.3–10.1)
CALCIUM SERPL-MCNC: 8.9 MG/DL (ref 8.3–10.1)
CHLORIDE SERPL-SCNC: 105 MMOL/L (ref 100–108)
CO2 SERPL-SCNC: 26 MMOL/L (ref 21–32)
CREAT SERPL-MCNC: 0.85 MG/DL (ref 0.6–1.3)
EOSINOPHIL # BLD AUTO: 0.32 THOUSAND/ΜL (ref 0–0.61)
EOSINOPHIL NFR BLD AUTO: 4 % (ref 0–6)
ERYTHROCYTE [DISTWIDTH] IN BLOOD BY AUTOMATED COUNT: 12.9 % (ref 11.6–15.1)
GFR SERPL CREATININE-BSD FRML MDRD: 94 ML/MIN/1.73SQ M
GLUCOSE SERPL-MCNC: 131 MG/DL (ref 65–140)
HCT VFR BLD AUTO: 37.3 % (ref 34.8–46.1)
HGB BLD-MCNC: 12.1 G/DL (ref 11.5–15.4)
IMM GRANULOCYTES # BLD AUTO: 0.01 THOUSAND/UL (ref 0–0.2)
IMM GRANULOCYTES NFR BLD AUTO: 0 % (ref 0–2)
LYMPHOCYTES # BLD AUTO: 1.77 THOUSANDS/ΜL (ref 0.6–4.47)
LYMPHOCYTES NFR BLD AUTO: 21 % (ref 14–44)
MCH RBC QN AUTO: 29.2 PG (ref 26.8–34.3)
MCHC RBC AUTO-ENTMCNC: 32.4 G/DL (ref 31.4–37.4)
MCV RBC AUTO: 90 FL (ref 82–98)
MONOCYTES # BLD AUTO: 0.38 THOUSAND/ΜL (ref 0.17–1.22)
MONOCYTES NFR BLD AUTO: 5 % (ref 4–12)
NEUTROPHILS # BLD AUTO: 5.67 THOUSANDS/ΜL (ref 1.85–7.62)
NEUTS SEG NFR BLD AUTO: 69 % (ref 43–75)
NRBC BLD AUTO-RTO: 0 /100 WBCS
PLATELET # BLD AUTO: 371 THOUSANDS/UL (ref 149–390)
PMV BLD AUTO: 9.7 FL (ref 8.9–12.7)
POTASSIUM SERPL-SCNC: 4.2 MMOL/L (ref 3.5–5.3)
PROT SERPL-MCNC: 8 G/DL (ref 6.4–8.2)
RBC # BLD AUTO: 4.14 MILLION/UL (ref 3.81–5.12)
SODIUM SERPL-SCNC: 136 MMOL/L (ref 136–145)
WBC # BLD AUTO: 8.26 THOUSAND/UL (ref 4.31–10.16)

## 2021-10-26 PROCEDURE — 80053 COMPREHEN METABOLIC PANEL: CPT

## 2021-10-26 PROCEDURE — 85025 COMPLETE CBC W/AUTO DIFF WBC: CPT

## 2021-10-26 PROCEDURE — 36415 COLL VENOUS BLD VENIPUNCTURE: CPT

## 2021-10-27 ENCOUNTER — HOSPITAL ENCOUNTER (OUTPATIENT)
Dept: RADIOLOGY | Facility: HOSPITAL | Age: 26
Discharge: HOME | End: 2021-10-27
Payer: COMMERCIAL

## 2021-10-27 DIAGNOSIS — R10.13 EPIGASTRIC PAIN: ICD-10-CM

## 2021-10-27 PROCEDURE — 76705 ECHO EXAM OF ABDOMEN: CPT

## 2021-10-29 ENCOUNTER — ANNUAL EXAM (OUTPATIENT)
Dept: OBGYN CLINIC | Facility: CLINIC | Age: 26
End: 2021-10-29
Payer: COMMERCIAL

## 2021-10-29 VITALS
SYSTOLIC BLOOD PRESSURE: 128 MMHG | DIASTOLIC BLOOD PRESSURE: 80 MMHG | BODY MASS INDEX: 37.77 KG/M2 | HEIGHT: 68 IN | WEIGHT: 249.2 LBS

## 2021-10-29 DIAGNOSIS — Z01.419 ENCNTR FOR GYN EXAM (GENERAL) (ROUTINE) W/O ABN FINDINGS: ICD-10-CM

## 2021-10-29 PROCEDURE — 99395 PREV VISIT EST AGE 18-39: CPT | Performed by: PHYSICIAN ASSISTANT

## 2021-10-29 PROCEDURE — G0145 SCR C/V CYTO,THINLAYER,RESCR: HCPCS | Performed by: PHYSICIAN ASSISTANT

## 2021-11-02 ENCOUNTER — APPOINTMENT (OUTPATIENT)
Dept: RADIOLOGY | Facility: CLINIC | Age: 26
End: 2021-11-02
Payer: COMMERCIAL

## 2021-11-02 ENCOUNTER — OFFICE VISIT (OUTPATIENT)
Dept: PODIATRY | Facility: CLINIC | Age: 26
End: 2021-11-02
Payer: COMMERCIAL

## 2021-11-02 VITALS
SYSTOLIC BLOOD PRESSURE: 116 MMHG | BODY MASS INDEX: 33.04 KG/M2 | HEIGHT: 68 IN | HEART RATE: 90 BPM | WEIGHT: 218 LBS | DIASTOLIC BLOOD PRESSURE: 81 MMHG

## 2021-11-02 DIAGNOSIS — M79.672 CHRONIC HEEL PAIN, LEFT: ICD-10-CM

## 2021-11-02 DIAGNOSIS — L60.0 INGROWING NAIL: ICD-10-CM

## 2021-11-02 DIAGNOSIS — G89.29 CHRONIC HEEL PAIN, LEFT: ICD-10-CM

## 2021-11-02 DIAGNOSIS — M20.5X2 ADDUCTOVARUS ROTATION OF TOE, ACQUIRED, LEFT: ICD-10-CM

## 2021-11-02 DIAGNOSIS — M79.672 CHRONIC HEEL PAIN, LEFT: Primary | ICD-10-CM

## 2021-11-02 DIAGNOSIS — G89.29 CHRONIC HEEL PAIN, LEFT: Primary | ICD-10-CM

## 2021-11-02 PROCEDURE — 99203 OFFICE O/P NEW LOW 30 MIN: CPT | Performed by: PODIATRIST

## 2021-11-02 PROCEDURE — 20550 NJX 1 TENDON SHEATH/LIGAMENT: CPT | Performed by: PODIATRIST

## 2021-11-02 PROCEDURE — 73650 X-RAY EXAM OF HEEL: CPT

## 2021-11-02 RX ORDER — LIDOCAINE HYDROCHLORIDE 10 MG/ML
1 INJECTION, SOLUTION INFILTRATION; PERINEURAL ONCE
Status: COMPLETED | OUTPATIENT
Start: 2021-11-02 | End: 2021-11-02

## 2021-11-02 RX ORDER — TRIAMCINOLONE ACETONIDE 40 MG/ML
40 INJECTION, SUSPENSION INTRA-ARTICULAR; INTRAMUSCULAR ONCE
Status: COMPLETED | OUTPATIENT
Start: 2021-11-02 | End: 2021-11-02

## 2021-11-02 RX ADMIN — LIDOCAINE HYDROCHLORIDE 1 ML: 10 INJECTION, SOLUTION INFILTRATION; PERINEURAL at 13:58

## 2021-11-02 RX ADMIN — TRIAMCINOLONE ACETONIDE 40 MG: 40 INJECTION, SUSPENSION INTRA-ARTICULAR; INTRAMUSCULAR at 13:59

## 2021-11-03 DIAGNOSIS — Z01.419 ENCOUNTER FOR GYNECOLOGICAL EXAMINATION WITHOUT ABNORMAL FINDING: ICD-10-CM

## 2021-11-04 LAB
LAB AP GYN PRIMARY INTERPRETATION: NORMAL
Lab: NORMAL

## 2021-11-27 ENCOUNTER — HOSPITAL ENCOUNTER (OUTPATIENT)
Dept: RADIOLOGY | Facility: HOSPITAL | Age: 26
Discharge: HOME | End: 2021-11-27

## 2021-11-27 DIAGNOSIS — R16.0 LIVER MASS, RIGHT LOBE: ICD-10-CM

## 2021-11-29 ENCOUNTER — HOSPITAL ENCOUNTER (OUTPATIENT)
Dept: MRI IMAGING | Facility: HOSPITAL | Age: 26
Discharge: HOME | End: 2021-11-29
Payer: COMMERCIAL

## 2021-11-29 PROCEDURE — A9581 GADOXETATE DISODIUM INJ: HCPCS | Performed by: NURSE PRACTITIONER

## 2021-11-29 PROCEDURE — 74183 MRI ABD W/O CNTR FLWD CNTR: CPT

## 2021-11-29 PROCEDURE — G1004 CDSM NDSC: HCPCS

## 2021-11-29 RX ADMIN — GADOXETATE DISODIUM 10 ML: 181.43 INJECTION, SOLUTION INTRAVENOUS at 17:17

## 2021-12-08 ENCOUNTER — CONSULT (OUTPATIENT)
Dept: DERMATOLOGY | Facility: CLINIC | Age: 26
End: 2021-12-08
Payer: COMMERCIAL

## 2021-12-08 VITALS — TEMPERATURE: 97.5 F | BODY MASS INDEX: 39.25 KG/M2 | WEIGHT: 259 LBS | HEIGHT: 68 IN

## 2021-12-08 DIAGNOSIS — L29.9 ITCHY SCALP: ICD-10-CM

## 2021-12-08 DIAGNOSIS — L21.9 SEBORRHEIC DERMATITIS: Primary | ICD-10-CM

## 2021-12-08 DIAGNOSIS — L65.9 HAIR LOSS: ICD-10-CM

## 2021-12-08 PROCEDURE — 99244 OFF/OP CNSLTJ NEW/EST MOD 40: CPT | Performed by: DERMATOLOGY

## 2021-12-08 RX ORDER — KETOCONAZOLE 20 MG/ML
SHAMPOO TOPICAL
Qty: 120 ML | Refills: 11 | Status: SHIPPED | OUTPATIENT
Start: 2021-12-08

## 2021-12-08 RX ORDER — FLUOCINONIDE TOPICAL SOLUTION USP, 0.05% 0.5 MG/ML
SOLUTION TOPICAL 2 TIMES DAILY
Qty: 60 ML | Refills: 2 | Status: SHIPPED | OUTPATIENT
Start: 2021-12-08

## 2021-12-27 DIAGNOSIS — F41.9 ANXIETY: ICD-10-CM

## 2021-12-27 RX ORDER — ESCITALOPRAM OXALATE 10 MG/1
10 TABLET ORAL DAILY
Qty: 90 TABLET | Refills: 0 | Status: SHIPPED | OUTPATIENT
Start: 2021-12-27 | End: 2022-03-23 | Stop reason: SDUPTHER

## 2021-12-28 ENCOUNTER — OFFICE VISIT (OUTPATIENT)
Dept: PODIATRY | Facility: CLINIC | Age: 26
End: 2021-12-28
Payer: COMMERCIAL

## 2021-12-28 VITALS
DIASTOLIC BLOOD PRESSURE: 74 MMHG | HEART RATE: 96 BPM | SYSTOLIC BLOOD PRESSURE: 118 MMHG | WEIGHT: 229 LBS | BODY MASS INDEX: 34.71 KG/M2 | HEIGHT: 68 IN

## 2021-12-28 DIAGNOSIS — M79.672 CHRONIC HEEL PAIN, LEFT: ICD-10-CM

## 2021-12-28 DIAGNOSIS — M76.62 ACHILLES TENDINITIS OF LEFT LOWER EXTREMITY: ICD-10-CM

## 2021-12-28 DIAGNOSIS — M72.2 PLANTAR FASCIITIS, LEFT: Primary | ICD-10-CM

## 2021-12-28 DIAGNOSIS — G89.29 CHRONIC HEEL PAIN, LEFT: ICD-10-CM

## 2021-12-28 PROCEDURE — 99213 OFFICE O/P EST LOW 20 MIN: CPT | Performed by: PODIATRIST

## 2022-01-03 ENCOUNTER — TELEPHONE (OUTPATIENT)
Dept: INTERNAL MEDICINE CLINIC | Facility: CLINIC | Age: 27
End: 2022-01-03

## 2022-01-03 PROCEDURE — U0005 INFEC AGEN DETEC AMPLI PROBE: HCPCS | Performed by: NURSE PRACTITIONER

## 2022-01-03 PROCEDURE — U0003 INFECTIOUS AGENT DETECTION BY NUCLEIC ACID (DNA OR RNA); SEVERE ACUTE RESPIRATORY SYNDROME CORONAVIRUS 2 (SARS-COV-2) (CORONAVIRUS DISEASE [COVID-19]), AMPLIFIED PROBE TECHNIQUE, MAKING USE OF HIGH THROUGHPUT TECHNOLOGIES AS DESCRIBED BY CMS-2020-01-R: HCPCS | Performed by: NURSE PRACTITIONER

## 2022-01-03 NOTE — TELEPHONE ENCOUNTER
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

## 2022-01-11 ENCOUNTER — OFFICE VISIT (OUTPATIENT)
Dept: INTERNAL MEDICINE CLINIC | Facility: CLINIC | Age: 27
End: 2022-01-11
Payer: COMMERCIAL

## 2022-01-11 VITALS
DIASTOLIC BLOOD PRESSURE: 80 MMHG | WEIGHT: 256.8 LBS | HEIGHT: 68 IN | OXYGEN SATURATION: 100 % | HEART RATE: 85 BPM | BODY MASS INDEX: 38.92 KG/M2 | TEMPERATURE: 97.6 F | SYSTOLIC BLOOD PRESSURE: 126 MMHG

## 2022-01-11 DIAGNOSIS — F41.9 ANXIETY: ICD-10-CM

## 2022-01-11 DIAGNOSIS — Z00.00 ANNUAL PHYSICAL EXAM: Primary | ICD-10-CM

## 2022-01-11 DIAGNOSIS — R10.12 LUQ PAIN: ICD-10-CM

## 2022-01-11 PROCEDURE — 99395 PREV VISIT EST AGE 18-39: CPT | Performed by: NURSE PRACTITIONER

## 2022-01-11 NOTE — PROGRESS NOTES
[facility]    NAME: Rhoda Barclay  AGE: 27 y o  SEX: female  : 1995      DATE: 2022     Assessment and Plan:     Problem List Items Addressed This Visit        Other    Anxiety     Stable  On lexapro  Continue counseling as needed         LUQ pain     No further blood in her stools  Schedule follow up with GI regarding LUQ pain and liver lesion  Other Visit Diagnoses     Annual physical exam    -  Primary          Immunizations and preventive care screenings were discussed with patient today  Appropriate education was printed on patient's after visit summary  BMI Counseling: Body mass index is 39 05 kg/m²  The BMI is above normal  Nutrition recommendations include decreasing portion sizes  Exercise recommendations include exercising 3-5 times per week  Rationale for BMI follow-up plan is due to patient being overweight or obese  Return in about 6 months (around 2022) for Next scheduled follow up  Chief Complaint:     Chief Complaint   Patient presents with    Annual Exam      History of Present Illness:     Adult Annual Physical   Patient here for a comprehensive physical exam  The patient reports problems - she continues to have intermittent LUQ pain  Pain is not worse and not associated with n/v/d or change in bowels  Diet and Physical Activity  · Diet/Nutrition: well balanced diet  · Exercise: 1-2 times a week on average  Depression Screening  PHQ-2/9 Depression Screening    Little interest or pleasure in doing things: 0 - not at all  Feeling down, depressed, or hopeless: 0 - not at all       [address] [place] [address]  · Sleep: sleeps well  · Hearing: normal - none   · Vision: goes for regular eye exams and wears contacts  · Dental: regular dental visits  /GYN Health  · Last menstrual period: regular  · Contraceptive method: oral contraceptives       Review of Systems: Review of Systems   Constitutional: Negative for activity change, appetite change, fatigue and unexpected weight change  Eyes: Negative for visual disturbance  Respiratory: Negative for cough, chest tightness and shortness of breath  Cardiovascular: Negative for chest pain, palpitations and leg swelling  Gastrointestinal: Positive for abdominal pain  Negative for blood in stool, constipation, diarrhea, nausea and vomiting  Genitourinary: Negative for difficulty urinating  Musculoskeletal: Negative for arthralgias  Skin: Negative for rash  Neurological: Negative for dizziness, weakness, light-headedness and headaches  Psychiatric/Behavioral: Negative for dysphoric mood and sleep disturbance  The patient is not nervous/anxious  Past Medical History:     Past Medical History:   Diagnosis Date    Allergic     Anxiety     Known health problems: none       Past Surgical History:     History reviewed  No pertinent surgical history     Social History:     Social History     Socioeconomic History    Marital status: Single     Spouse name: None    Number of children: None    Years of education: None    Highest education level: None   Occupational History    None   Tobacco Use    Smoking status: Never Smoker    Smokeless tobacco: Never Used   Vaping Use    Vaping Use: Never used   Substance and Sexual Activity    Alcohol use: Yes     Comment: Social     Drug use: No    Sexual activity: Yes     Partners: Male     Birth control/protection: Pill   Other Topics Concern    None   Social History Narrative    Denied history of daily caffeinated coffee consumption    Exercises strenuously less than 3 times weekly     Social Determinants of Health     Financial Resource Strain: Not on file   Food Insecurity: Not on file   Transportation Needs: Not on file   Physical Activity: Not on file   Stress: Not on file   Social Connections: Not on file   Intimate Partner Violence: Not on file   Housing Stability: Not on file      Family History:     Family History   Problem Relation Age of Onset    Hyperthyroidism Mother         when pregnant     No Known Problems Father     Cancer Maternal Grandmother         uterine     Cancer Maternal Aunt         melanoma    Alcohol abuse Neg Hx     Substance Abuse Neg Hx     Mental illness Neg Hx     Depression Neg Hx       Current Medications:     Current Outpatient Medications   Medication Sig Dispense Refill    escitalopram (LEXAPRO) 10 mg tablet Take 1 tablet (10 mg total) by mouth daily 90 tablet 0    fluocinonide (LIDEX) 0 05 % external solution Apply topically 2 (two) times a day 60 mL 2    ketoconazole (NIZORAL) 2 % shampoo Daily for 2 weeks straight and then on "Mondays, Wednesdays and Fridays":  Lather into scalp; leave on for 5 minutes and then rinse off completely  120 mL 11    norethindrone-ethinyl estradiol-ferrous fumarate (Lomedia 24 FE) 1-20 MG-MCG(24) per tablet Take 1 tablet by mouth daily 90 tablet 3     No current facility-administered medications for this visit  Allergies:     No Known Allergies   Physical Exam:     /80   Pulse 85   Temp 97 6 °F (36 4 °C)   Ht 5' 8" (1 727 m)   Wt 116 kg (256 lb 12 8 oz)   SpO2 100%   BMI 39 05 kg/m²     Physical Exam  Vitals reviewed  Constitutional:       Appearance: Normal appearance  HENT:      Head: Normocephalic and atraumatic  Eyes:      Conjunctiva/sclera: Conjunctivae normal    Cardiovascular:      Rate and Rhythm: Normal rate and regular rhythm  Heart sounds: Normal heart sounds  Pulmonary:      Effort: Pulmonary effort is normal       Breath sounds: Normal breath sounds  Abdominal:      General: Bowel sounds are normal  There is no distension  Palpations: Abdomen is soft  Tenderness: There is no abdominal tenderness  Musculoskeletal:         General: Normal range of motion  Cervical back: Neck supple  Right lower leg: No edema        Left lower leg: No edema  Skin:     General: Skin is warm and dry  Neurological:      Mental Status: She is alert and oriented to person, place, and time     Psychiatric:         Mood and Affect: Mood normal          Behavior: Behavior normal           [facility]

## 2022-01-18 ENCOUNTER — OFFICE VISIT (OUTPATIENT)
Dept: GASTROENTEROLOGY | Facility: CLINIC | Age: 27
End: 2022-01-18
Payer: COMMERCIAL

## 2022-01-18 ENCOUNTER — LAB (OUTPATIENT)
Dept: LAB | Age: 27
End: 2022-01-18
Payer: COMMERCIAL

## 2022-01-18 VITALS
BODY MASS INDEX: 38.8 KG/M2 | HEIGHT: 68 IN | HEART RATE: 106 BPM | WEIGHT: 256 LBS | DIASTOLIC BLOOD PRESSURE: 90 MMHG | SYSTOLIC BLOOD PRESSURE: 119 MMHG

## 2022-01-18 DIAGNOSIS — D13.4 HEPATIC ADENOMA: ICD-10-CM

## 2022-01-18 DIAGNOSIS — R10.13 EPIGASTRIC PAIN: ICD-10-CM

## 2022-01-18 DIAGNOSIS — K92.1 HEMATOCHEZIA: Primary | ICD-10-CM

## 2022-01-18 DIAGNOSIS — R11.2 NAUSEA AND VOMITING, INTRACTABILITY OF VOMITING NOT SPECIFIED, UNSPECIFIED VOMITING TYPE: ICD-10-CM

## 2022-01-18 LAB
AFP-TM SERPL-MCNC: 2.5 NG/ML (ref 0.5–8)
ALBUMIN SERPL BCP-MCNC: 3.3 G/DL (ref 3.5–5)
ALP SERPL-CCNC: 70 U/L (ref 46–116)
ALT SERPL W P-5'-P-CCNC: 22 U/L (ref 12–78)
ANION GAP SERPL CALCULATED.3IONS-SCNC: 5 MMOL/L (ref 4–13)
AST SERPL W P-5'-P-CCNC: 16 U/L (ref 5–45)
BILIRUB SERPL-MCNC: 0.39 MG/DL (ref 0.2–1)
BUN SERPL-MCNC: 14 MG/DL (ref 5–25)
CALCIUM ALBUM COR SERPL-MCNC: 10.1 MG/DL (ref 8.3–10.1)
CALCIUM SERPL-MCNC: 9.5 MG/DL (ref 8.3–10.1)
CHLORIDE SERPL-SCNC: 105 MMOL/L (ref 100–108)
CO2 SERPL-SCNC: 25 MMOL/L (ref 21–32)
CREAT SERPL-MCNC: 0.76 MG/DL (ref 0.6–1.3)
GFR SERPL CREATININE-BSD FRML MDRD: 107 ML/MIN/1.73SQ M
GLUCOSE P FAST SERPL-MCNC: 96 MG/DL (ref 65–99)
HBV CORE AB SER QL: NORMAL
HBV SURFACE AB SER-ACNC: 27.97 MIU/ML
HBV SURFACE AG SER QL: NORMAL
HCV AB SER QL: NORMAL
POTASSIUM SERPL-SCNC: 4.6 MMOL/L (ref 3.5–5.3)
PROT SERPL-MCNC: 7.9 G/DL (ref 6.4–8.2)
SODIUM SERPL-SCNC: 135 MMOL/L (ref 136–145)

## 2022-01-18 PROCEDURE — 80053 COMPREHEN METABOLIC PANEL: CPT

## 2022-01-18 PROCEDURE — 36415 COLL VENOUS BLD VENIPUNCTURE: CPT

## 2022-01-18 PROCEDURE — 86706 HEP B SURFACE ANTIBODY: CPT

## 2022-01-18 PROCEDURE — 82105 ALPHA-FETOPROTEIN SERUM: CPT

## 2022-01-18 PROCEDURE — 87340 HEPATITIS B SURFACE AG IA: CPT

## 2022-01-18 PROCEDURE — 86704 HEP B CORE ANTIBODY TOTAL: CPT

## 2022-01-18 PROCEDURE — 86803 HEPATITIS C AB TEST: CPT

## 2022-01-18 PROCEDURE — 99204 OFFICE O/P NEW MOD 45 MIN: CPT | Performed by: INTERNAL MEDICINE

## 2022-01-18 RX ORDER — PANTOPRAZOLE SODIUM 40 MG/1
40 TABLET, DELAYED RELEASE ORAL DAILY
Qty: 30 TABLET | Refills: 3 | Status: SHIPPED | OUTPATIENT
Start: 2022-01-18 | End: 2022-07-12

## 2022-01-18 NOTE — PROGRESS NOTES
[facility] - Outpatient Consultation  Lamonte Sykes 27 y o  female MRN: [id number]  Encounter: [id number]        ASSESSMENT AND PLAN:      1  Hepatic adenoma  Is by far the most likely explanation of her hepatic masses  We discussed this in some detail  Recommend discontinuation of oral contraceptives and she will discuss this with her gynecologist to determine whether she needs to wean off of this  Also recommend weight loss through diet and exercise given underlying hepatic steatosis  Will check labs as below  Will plan repeat imaging 6 months after her MRI  We discussed risk of rupture and hemorrhage as well as low risk of malignant transformation  As the masses are relatively small, surgical consultation is not necessary at this time  If they do not shrink with discontinuation of oral contraceptives, this may be a consideration, however  - Comprehensive metabolic panel; Future  - AFP tumor marker; Future  - Hepatitis C antibody; Future  - Hepatitis B surface antibody; Future  - Hepatitis B surface antigen; Future  - Hepatitis B core antibody, total; Future  - US right upper quadrant; Future    2  Hematochezia  Likely hemorrhoidal and unrelated to her liver pathology  Has resolved    3  Epigastric pain  4  Nausea and vomiting, intractability of vomiting not specified, unspecified vomiting type  This may be related to hepatic adenomas though other etiologies are possible  Will give a course of pantoprazole and if symptoms do not improve in the next few weeks will consider further evaluation with EGD    - pantoprazole (PROTONIX) 40 mg tablet; Take 1 tablet (40 mg total) by mouth daily  Dispense: 30 tablet; Refill: 3      ______________________________________________________________________    HPI:  Patient works as an occupational therapist at [employer]    Initially experienced epigastric and right upper quadrant abdominal pain as well as change in bowel habit with scant hematochezia around October of 2021  Underwent abdominal ultrasound which suggested hepatic mass and was followed up with MRI revealing 2 masses in the liver less than 5 cm and most consistent with adenoma versus FNH  Patient takes oral contraceptives  Has had continued symptoms of upper abdominal pain and nausea, now with vomiting last night for the 1st time  The hematochezia was short-lived for just a few days and described as bright red blood coating brown stool, now resolved  No family history of gastrointestinal disease  REVIEW OF SYSTEMS:    Review of Systems   All other systems reviewed and are negative  Historical Information   Past Medical History:   Diagnosis Date    Allergic     Anxiety     Known health problems: none      History reviewed  No pertinent surgical history  Social History   Social History     Substance and Sexual Activity   Alcohol Use Yes    Comment: Social      Social History     Substance and Sexual Activity   Drug Use No     Social History     Tobacco Use   Smoking Status Never Smoker   Smokeless Tobacco Never Used     Family History   Problem Relation Age of Onset    Hyperthyroidism Mother         when pregnant     No Known Problems Father     Cancer Maternal Grandmother         uterine     Cancer Maternal Aunt         melanoma    Alcohol abuse Neg Hx     Substance Abuse Neg Hx     Mental illness Neg Hx     Depression Neg Hx        Meds/Allergies       Current Outpatient Medications:     escitalopram (LEXAPRO) 10 mg tablet    fluocinonide (LIDEX) 0 05 % external solution    ketoconazole (NIZORAL) 2 % shampoo    norethindrone-ethinyl estradiol-ferrous fumarate (Lomedia 24 FE) 1-20 MG-MCG(24) per tablet    pantoprazole (PROTONIX) 40 mg tablet    No Known Allergies        Objective     Blood pressure 119/90, pulse (!) 106, height 5' 8" (1 727 m), weight 116 kg (256 lb)  Body mass index is 38 92 kg/m²          PHYSICAL EXAM:      Physical Exam  Vitals and nursing note reviewed  Constitutional:       General: She is not in acute distress  Appearance: She is not ill-appearing  HENT:      Head: Normocephalic and atraumatic  Eyes:      General: No scleral icterus  Extraocular Movements: Extraocular movements intact  Cardiovascular:      Rate and Rhythm: Normal rate and regular rhythm  Pulmonary:      Effort: Pulmonary effort is normal  No respiratory distress  Abdominal:      General: There is no distension  Palpations: Abdomen is soft  Tenderness: There is no abdominal tenderness  There is no guarding or rebound  Musculoskeletal:      Right lower leg: No edema  Left lower leg: No edema  Skin:     General: Skin is warm and dry  Coloration: Skin is not cyanotic  Findings: No erythema  Neurological:      General: No focal deficit present  Mental Status: She is alert and oriented to person, place, and time  Psychiatric:         Mood and Affect: Mood normal          Behavior: Behavior normal               Lab Results:   No visits with results within 1 Day(s) from this visit  Latest known visit with results is:   Orders Only on 01/03/2022   Component Date Value    SARS-CoV-2 01/03/2022 Negative          Radiology Results:   No results found

## 2022-01-20 ENCOUNTER — HOSPITAL ENCOUNTER (OUTPATIENT)
Dept: RADIOLOGY | Facility: HOSPITAL | Age: 27
Discharge: HOME | End: 2022-01-20
Attending: PODIATRIST
Payer: COMMERCIAL

## 2022-01-20 DIAGNOSIS — G89.29 CHRONIC HEEL PAIN, LEFT: ICD-10-CM

## 2022-01-20 DIAGNOSIS — M72.2 PLANTAR FASCIITIS, LEFT: ICD-10-CM

## 2022-01-20 DIAGNOSIS — M79.672 CHRONIC HEEL PAIN, LEFT: ICD-10-CM

## 2022-01-20 DIAGNOSIS — M76.62 ACHILLES TENDINITIS OF LEFT LOWER EXTREMITY: ICD-10-CM

## 2022-01-20 PROCEDURE — G1004 CDSM NDSC: HCPCS

## 2022-01-20 PROCEDURE — 73721 MRI JNT OF LWR EXTRE W/O DYE: CPT

## 2022-01-20 NOTE — RESULT ENCOUNTER NOTE
Please call the patient regarding her result  Labs unremarkable    No evidence of underlying viral hepatitis or liver cancer

## 2022-01-26 ENCOUNTER — OFFICE VISIT (OUTPATIENT)
Dept: PODIATRY | Facility: CLINIC | Age: 27
End: 2022-01-26
Payer: COMMERCIAL

## 2022-01-26 VITALS
HEART RATE: 80 BPM | HEIGHT: 68 IN | SYSTOLIC BLOOD PRESSURE: 115 MMHG | DIASTOLIC BLOOD PRESSURE: 76 MMHG | WEIGHT: 257 LBS | BODY MASS INDEX: 38.95 KG/M2

## 2022-01-26 DIAGNOSIS — G89.29 CHRONIC HEEL PAIN, LEFT: ICD-10-CM

## 2022-01-26 DIAGNOSIS — M79.672 CHRONIC HEEL PAIN, LEFT: ICD-10-CM

## 2022-01-26 DIAGNOSIS — M72.2 PLANTAR FASCIITIS, LEFT: Primary | ICD-10-CM

## 2022-01-26 PROCEDURE — 99213 OFFICE O/P EST LOW 20 MIN: CPT | Performed by: PODIATRIST

## 2022-01-26 NOTE — PROGRESS NOTES
Assessment/Plan:         Diagnoses and all orders for this visit:    Plantar fasciitis, left    Chronic heel pain, left      MRI reviewed in detail with patient  She does have chronic plantar fasciitis easily visualized on the imaging with a moderate heel spur  At this point she has a numerous injections, physical therapy, dry needle, other interventions without any significant long-lasting relief  She can consider plantar fasciotomy but again she is only 27 years young and if this is something she can and minimize over the next 10-20 years before undergoing surgery would benefit  Patient states at the moment the pain is not too bad and she is able to manage it through therapy and inserts and shoes  She would like to call me if pain becomes unbearable it which point I would suggest a plantar fascial release  Subjective:      Patient ID: Betzy Marcelino is a 27 y o  female  12/28/21: PAtient left heel pain for 7 years  She got an injection Last month which did improve for about a week but the pain came back  She has been consistent with therapy  She has been seeing her and his physical therapist   She has tried numerous modalities over the last 7 years without any real resolution to her heel pain  Pain is mostly on the bottom of her periods of rest but often sometimes shows at the back of the heel as well        1/26/22:  Patient had MRI of her ankle  She is here to go over results  The pain in her heel comes and goes and at the moment is in terrible  She continues to do therapy daily  The following portions of the patient's history were reviewed and updated as appropriate: allergies, current medications, past family history, past medical history, past social history, past surgical history and problem list     Review of Systems   Constitutional: Negative  Gastrointestinal: Negative for diarrhea, nausea and vomiting  Musculoskeletal: Positive for arthralgias  Negative for gait problem     Skin: Negative for color change and wound  Neurological: Negative for numbness  Objective:      /76   Pulse 80   Ht 5' 8" (1 727 m) Comment: verbal  Wt 117 kg (257 lb)   BMI 39 08 kg/m²          Physical Exam  Vitals reviewed  Constitutional:       General: She is not in acute distress  Appearance: She is obese  She is not toxic-appearing  Cardiovascular:      Pulses: Normal pulses  Dorsalis pedis pulses are 2+ on the left side  Posterior tibial pulses are 2+ on the left side  Musculoskeletal:      Left foot: Normal range of motion  No deformity, bunion or prominent metatarsal heads  Feet:    Feet:      Left foot:      Skin integrity: Skin integrity normal       Toenail Condition: Left toenails are normal    Neurological:      Mental Status: She is alert  I personally reviewed the MRI  There is some inflammation of the lateral abductor digiti minimi suggestive of Webb's neuritis  My opinion this is all part of chronic plantar fascial inflammation  The fascial insertion is clearly inflamed thickened with minor tearing noted  Her Achilles insertion appears normal in the MRI

## 2022-01-26 NOTE — PATIENT INSTRUCTIONS
Plantar Fasciitis Exercises   WHAT YOU NEED TO KNOW:   Plantar fasciitis exercises help stretch your plantar fascia, calf muscles, and Achilles tendon  They also help strengthen the muscles that support your heel and foot  Exercises and stretching can help prevent plantar fasciitis from getting worse or coming back  DISCHARGE INSTRUCTIONS:   Contact your healthcare provider if:   · Your pain and swelling increase  · You develop new knee, hip, or back pain  · You have questions or concerns about your condition or care  How to do plantar fasciitis exercises:  Ask your healthcare provider when to start these exercises and how often to do them  · Slant board stretch:  Stand on a slanted board with your toes higher than your heel  Press your heel into the board  Keep your knee slightly bent  Hold this position for 1 minute  Repeat 5 times  · Heel stretch:  Stand up straight with your hands on a wall  Place your injured leg slightly behind your other leg  Keep your heels flat on the floor, lean forward, and bend both knees  Hold for 30 seconds  · Calf stretch:  Stand up straight with your hands on a wall  Step forward so that your uninjured foot is in front of your injured foot  Keep your front leg bent and your back leg straight  Gently lean forward until you feel your calf stretch  Hold for 30 seconds and then relax  · Seated plantar fascia stretch:  Sit on a firm surface, such as the floor or a mat  Extend your legs out in front of you  Raise your injured foot a few inches off the ground  Keep your leg straight  Grab the toes of your injured foot and pull them toward you  With your other hand, feel your plantar fascia  You should feel it press outward  Hold for 30 seconds  If you cannot reach your toes, loop a towel or tie around your foot  Gently pull on the towel or tie and flex your toes toward you  · Heel raises:  Stand on the injured leg   Raise your other leg off the ground  Hold onto a railing or wall for balance  Slowly rise up on the toes of your injured leg  Hold for 5 seconds  Slowly lower your heel to the ground  · Toe curls:  Place a towel on the floor  Put your foot flat on the towel  Grab the towel with your toes by curling them around the towel  Lift the towel up with your toes  · Toe taps:  Sit down and place your foot flat on the floor  Keep your heel on the floor  Point all your toes up toward the ceiling  While the 4 smaller toes are pointed up, bend your big toe down and tap it on the ground  Do 10 to 50 taps  Point all 5 toes up toward the ceiling again  This time keep your big toe pointed up and tap the 4 smaller toes on the ground  Do 10 to 50 taps each time  Follow up with your doctor as directed:  Write down your questions so you remember to ask them during your visits  © Copyright [employer] 2021 Information is for End User's use only and may not be sold, redistributed or otherwise used for commercial purposes  All illustrations and images included in CareNotes® are the copyrighted property of [employer]  or [facility] [employer]   The above information is an  only  It is not intended as medical advice for individual conditions or treatments  Talk to your doctor, nurse or pharmacist before following any medical regimen to see if it is safe and effective for you

## 2022-02-01 ENCOUNTER — OFFICE VISIT (OUTPATIENT)
Dept: OBGYN CLINIC | Facility: CLINIC | Age: 27
End: 2022-02-01
Payer: COMMERCIAL

## 2022-02-01 VITALS — SYSTOLIC BLOOD PRESSURE: 122 MMHG | DIASTOLIC BLOOD PRESSURE: 62 MMHG | BODY MASS INDEX: 39.23 KG/M2 | WEIGHT: 258 LBS

## 2022-02-01 DIAGNOSIS — Z01.419 ENCNTR FOR GYN EXAM (GENERAL) (ROUTINE) W/O ABN FINDINGS: ICD-10-CM

## 2022-02-01 DIAGNOSIS — Z01.419 ENCOUNTER FOR GYNECOLOGICAL EXAMINATION WITHOUT ABNORMAL FINDING: ICD-10-CM

## 2022-02-01 DIAGNOSIS — Z30.09 BIRTH CONTROL COUNSELING: Primary | ICD-10-CM

## 2022-02-01 PROCEDURE — 99213 OFFICE O/P EST LOW 20 MIN: CPT | Performed by: PHYSICIAN ASSISTANT

## 2022-02-01 RX ORDER — MISOPROSTOL 200 UG/1
TABLET ORAL
Qty: 2 TABLET | Refills: 0 | Status: SHIPPED | OUTPATIENT
Start: 2022-02-01

## 2022-02-01 RX ORDER — NORETHINDRONE ACETATE AND ETHINYL ESTRADIOL AND FERROUS FUMARATE 1MG-20(24)
1 KIT ORAL DAILY
Qty: 90 TABLET | Refills: 3 | Status: SHIPPED | OUTPATIENT
Start: 2022-02-01

## 2022-02-01 NOTE — PROGRESS NOTES
Debbie Peter  1995      CC: Birth control consultation    S:  27 y o  female here for birth control discussion  She is currently on Lomedia Fe 24  She was recently found to have hepatic adenomas and was advised by GI to discontinue her OCPs  She presents today to discuss alternative options for contraception  We reviewed the risks and benefits of progesterone only birth control pills, Depo Provera, Mirena, Kyleena, ParaGard and Nexplanon in detail today  She would like a [facility]  Current Outpatient Medications:     escitalopram (LEXAPRO) 10 mg tablet, Take 1 tablet (10 mg total) by mouth daily, Disp: 90 tablet, Rfl: 0    fluocinonide (LIDEX) 0 05 % external solution, Apply topically 2 (two) times a day, Disp: 60 mL, Rfl: 2    ketoconazole (NIZORAL) 2 % shampoo, Daily for 2 weeks straight and then on "Mondays, Wednesdays and Fridays":  Lather into scalp; leave on for 5 minutes and then rinse off completely  , Disp: 120 mL, Rfl: 11    misoprostol (CYTOTEC) 200 mcg tablet, Two tablets per vagina two hours prior to procedure, Disp: 2 tablet, Rfl: 0    norethindrone-ethinyl estradiol-ferrous fumarate (Lomedia 24 FE) 1-20 MG-MCG(24) per tablet, Take 1 tablet by mouth daily, Disp: 90 tablet, Rfl: 3    pantoprazole (PROTONIX) 40 mg tablet, Take 1 tablet (40 mg total) by mouth daily, Disp: 30 tablet, Rfl: 3  Social History     Socioeconomic History    Marital status: Single     Spouse name: Not on file    Number of children: Not on file    Years of education: Not on file    Highest education level: Not on file   Occupational History    Not on file   Tobacco Use    Smoking status: Never Smoker    Smokeless tobacco: Never Used   Vaping Use    Vaping Use: Never used   Substance and Sexual Activity    Alcohol use: Yes     Comment: Social     Drug use: No    Sexual activity: Yes     Partners: Male     Birth control/protection: Pill   Other Topics Concern    Not on file   Social History Narrative    Denied history of daily caffeinated coffee consumption    Exercises strenuously less than 3 times weekly     Social Determinants of Health     Financial Resource Strain: Not on file   Food Insecurity: Not on file   Transportation Needs: Not on file   Physical Activity: Not on file   Stress: Not on file   Social Connections: Not on file   Intimate Partner Violence: Not on file   Housing Stability: Not on file     Family History   Problem Relation Age of Onset    Hyperthyroidism Mother         when pregnant     No Known Problems Father     Cancer Maternal Grandmother         uterine     Cancer Maternal Aunt         melanoma    Alcohol abuse Neg Hx     Substance Abuse Neg Hx     Mental illness Neg Hx     Depression Neg Hx      Past Medical History:   Diagnosis Date    Allergic     Anxiety     Known health problems: none        Review of Systems   Respiratory: Negative  Cardiovascular: Negative  Gastrointestinal: Negative for constipation and diarrhea  Genitourinary: Negative for difficulty urinating, pelvic pain, vaginal bleeding, vaginal discharge, itching or odor  O:   Blood pressure 122/62, weight 117 kg (258 lb)  A:   Birth control counseling     P:   [facility]  Will premedicate with ibuprofen and Cytotec  Continue OCP until insertion

## 2022-03-10 ENCOUNTER — HOSPITAL ENCOUNTER (OUTPATIENT)
Dept: RADIOLOGY | Facility: HOSPITAL | Age: 27
Discharge: HOME | End: 2022-03-10
Attending: INTERNAL MEDICINE
Payer: COMMERCIAL

## 2022-03-10 DIAGNOSIS — D13.4 HEPATIC ADENOMA: ICD-10-CM

## 2022-03-10 PROCEDURE — 76705 ECHO EXAM OF ABDOMEN: CPT

## 2022-03-15 ENCOUNTER — OFFICE VISIT (OUTPATIENT)
Dept: GASTROENTEROLOGY | Facility: CLINIC | Age: 27
End: 2022-03-15
Payer: COMMERCIAL

## 2022-03-15 VITALS
HEIGHT: 68 IN | WEIGHT: 258 LBS | HEART RATE: 100 BPM | BODY MASS INDEX: 39.1 KG/M2 | SYSTOLIC BLOOD PRESSURE: 153 MMHG | DIASTOLIC BLOOD PRESSURE: 90 MMHG

## 2022-03-15 DIAGNOSIS — R11.2 NAUSEA AND VOMITING, INTRACTABILITY OF VOMITING NOT SPECIFIED, UNSPECIFIED VOMITING TYPE: ICD-10-CM

## 2022-03-15 DIAGNOSIS — D13.4 HEPATIC ADENOMA: Primary | ICD-10-CM

## 2022-03-15 DIAGNOSIS — K92.1 HEMATOCHEZIA: ICD-10-CM

## 2022-03-15 DIAGNOSIS — R10.11 RIGHT UPPER QUADRANT PAIN: ICD-10-CM

## 2022-03-15 PROCEDURE — 99213 OFFICE O/P EST LOW 20 MIN: CPT | Performed by: INTERNAL MEDICINE

## 2022-03-15 NOTE — PROGRESS NOTES
[facility] - Outpatient Follow-up Note  Gaudencio Rosas 27 y o  female MRN: [id number]  Encounter: [id number]          ASSESSMENT AND PLAN:      1  Hepatic adenoma  Has seen OB gyn with plan to switch to IUD  No enlargement on ultrasound  Will plan repeat MRI in 6 months  - MRI abdomen w wo contrast; Future    2  Right upper quadrant pain  Etiology not clear  Possibly secondary to hepatic mass  No gallbladder pathology noted on ultrasound    3  Hematochezia  Resolved  Likely hemorrhoidal  discussed possible evaluation with colonoscopy which she will consider    4  Nausea and vomiting, intractability of vomiting not specified, unspecified vomiting type  Resolved  No affect from PPI  Will discontinue this    ______________________________________________________________________    SUBJECTIVE:  Patient returns in follow-up of hepatic masses likely adenomas based on MRI, likely secondary to oral contraception  She has followed up with OBGYN  Recent Repeat ultrasound showed no significant changes  Patient reports some right upper quadrant discomfort intermittently, not associated with oral intake or bowel movements  REVIEW OF SYSTEMS:    Review of Systems   Gastrointestinal: Positive for abdominal pain  Negative for bloating, anorexia, diarrhea, dysphagia, jaundice, nausea and vomiting  Historical Information   Past Medical History:   Diagnosis Date    Allergic     Anxiety     Known health problems: none      History reviewed  No pertinent surgical history    Social History   Social History     Substance and Sexual Activity   Alcohol Use Yes    Comment: Social      Social History     Substance and Sexual Activity   Drug Use No     Social History     Tobacco Use   Smoking Status Never Smoker   Smokeless Tobacco Never Used     Family History   Problem Relation Age of Onset    Hyperthyroidism Mother         when pregnant     No Known Problems Father     Cancer Maternal Grandmother         uterine     Cancer Maternal Aunt         melanoma    Alcohol abuse Neg Hx     Substance Abuse Neg Hx     Mental illness Neg Hx     Depression Neg Hx        Meds/Allergies       Current Outpatient Medications:     escitalopram (LEXAPRO) 10 mg tablet    fluocinonide (LIDEX) 0 05 % external solution    ketoconazole (NIZORAL) 2 % shampoo    misoprostol (CYTOTEC) 200 mcg tablet    norethindrone-ethinyl estradiol-ferrous fumarate (Lomedia 24 FE) 1-20 MG-MCG(24) per tablet    pantoprazole (PROTONIX) 40 mg tablet    No Known Allergies        Objective     Blood pressure 153/90, pulse 100, height 5' 8" (1 727 m), weight 117 kg (258 lb)  Body mass index is 39 23 kg/m²  PHYSICAL EXAM:      Physical Exam     Lab Results:   No visits with results within 1 Day(s) from this visit  Latest known visit with results is:   Lab on 01/18/2022   Component Date Value    Sodium 01/18/2022 135*    Potassium 01/18/2022 4 6     Chloride 01/18/2022 105     CO2 01/18/2022 25     ANION GAP 01/18/2022 5     BUN 01/18/2022 14     Creatinine 01/18/2022 0 76     Glucose, Fasting 01/18/2022 96     Calcium 01/18/2022 9 5     Corrected Calcium 01/18/2022 10 1     AST 01/18/2022 16     ALT 01/18/2022 22     Alkaline Phosphatase 01/18/2022 70     Total Protein 01/18/2022 7 9     Albumin 01/18/2022 3 3*    Total Bilirubin 01/18/2022 0 39     eGFR 01/18/2022 107     AFP TUMOR MARKER 01/18/2022 2 5     Hepatitis C Ab 01/18/2022 Non-reactive     Hep B S Ab 01/18/2022 27 97     Hepatitis B Surface Ag 01/18/2022 Non-reactive     Hep B Core Total Ab 01/18/2022 Non-reactive          Radiology Results:   US right upper quadrant    Result Date: 3/14/2022  Narrative: RIGHT UPPER QUADRANT ULTRASOUND INDICATION:     D13 4: Benign neoplasm of liver  COMPARISON:  Right upper quadrant ultrasound dated 10/27/2021  Abdominal MRI dated 11/29/2021   TECHNIQUE:   Real-time ultrasound of the right upper quadrant was performed with a curvilinear transducer with both volumetric sweeps and still imaging techniques  FINDINGS: PANCREAS:  Portions of the pancreas are obscured by bowel gas  Visualized portions of the pancreas are unremarkable  AORTA AND IVC:  Visualized portions are normal for patient age  LIVER: Size:  Within normal range  The liver measures 19 7 cm in the midclavicular line  Contour:  Surface contour is smooth  Parenchyma: There is moderate diffuse increased echogenicity with smooth echotexture and acoustic beam attenuation  Most consistent with moderate hepatic steatosis  There is a 2 0 x 1 8 x 2 0 cm hypoechoic lesion in the left hepatic lobe which may correlate with the segment 4A/8 lesion suggestive of FNH described on previous abdominal MRI  0 7 x 0 6 x 0 8 cm hypoechoic lesion in the left hepatic lobe is too small to characterize  There is a 2 6 x 1 4 x 1 7 cm hypoechoic lesion in the inferior right hepatic lobe which may correlate with the segment 6/7 lesion suggesting adenoma versus FNH described on abdominal MRI  3 8 x 4 3 x 2 2 cm mid posterior right hepatic  lobe lesion is not significantly changed from abdominal ultrasound dated 10/27/2021  Limited imaging of the main portal vein shows it to be patent and hepatopetal   BILIARY: No gallbladder findings  No sonographic Chakraborty's sign elicited  No intrahepatic biliary dilatation  CBD measures 5 mm  No choledocholithiasis  KIDNEY: Right kidney measures 12 0 x 4 5 x 5 6 cm  Volume 156 2 mL No hydronephrosis  ASCITES:   None  Impression: Stable liver lesions, most of which were characterized as adenoma versus FNH on previous abdominal MRI   Workstation performed: [id number]

## 2022-03-23 DIAGNOSIS — F41.9 ANXIETY: ICD-10-CM

## 2022-03-23 RX ORDER — ESCITALOPRAM OXALATE 10 MG/1
10 TABLET ORAL DAILY
Qty: 90 TABLET | Refills: 0 | Status: SHIPPED | OUTPATIENT
Start: 2022-03-23 | End: 2022-06-20

## 2022-05-31 ENCOUNTER — TELEPHONE (OUTPATIENT)
Dept: OBGYN CLINIC | Facility: HOSPITAL | Age: 27
End: 2022-05-31

## 2022-05-31 DIAGNOSIS — L60.0 INGROWING NAIL: Primary | ICD-10-CM

## 2022-05-31 RX ORDER — CEPHALEXIN 500 MG/1
500 CAPSULE ORAL EVERY 6 HOURS SCHEDULED
Qty: 20 CAPSULE | Refills: 0 | Status: SHIPPED | OUTPATIENT
Start: 2022-05-31 | End: 2022-06-05

## 2022-06-18 DIAGNOSIS — F41.9 ANXIETY: ICD-10-CM

## 2022-06-20 RX ORDER — ESCITALOPRAM OXALATE 10 MG/1
TABLET ORAL
Qty: 90 TABLET | Refills: 0 | Status: SHIPPED | OUTPATIENT
Start: 2022-06-20

## 2022-07-12 ENCOUNTER — OFFICE VISIT (OUTPATIENT)
Dept: INTERNAL MEDICINE CLINIC | Facility: CLINIC | Age: 27
End: 2022-07-12
Payer: COMMERCIAL

## 2022-07-12 VITALS
DIASTOLIC BLOOD PRESSURE: 70 MMHG | SYSTOLIC BLOOD PRESSURE: 116 MMHG | TEMPERATURE: 97.8 F | HEIGHT: 68 IN | HEART RATE: 70 BPM | WEIGHT: 264.4 LBS | BODY MASS INDEX: 40.07 KG/M2 | OXYGEN SATURATION: 98 %

## 2022-07-12 DIAGNOSIS — Z00.8 HEALTH EXAMINATION IN POPULATION SURVEY: ICD-10-CM

## 2022-07-12 DIAGNOSIS — R10.11 RIGHT UPPER QUADRANT PAIN: ICD-10-CM

## 2022-07-12 DIAGNOSIS — D13.4 HEPATIC ADENOMA: ICD-10-CM

## 2022-07-12 DIAGNOSIS — F41.9 ANXIETY: Primary | ICD-10-CM

## 2022-07-12 PROCEDURE — 99214 OFFICE O/P EST MOD 30 MIN: CPT | Performed by: NURSE PRACTITIONER

## 2022-07-12 NOTE — PROGRESS NOTES
Assessment/Plan:    Hepatic adenoma  Repeat MRI in September  Sees GI  Anxiety  Stable  On lexapro  Counseling as needed  Right upper quadrant pain  Symptoms less often  Could be due to hepatic mass  Due for repeat MRI in September  Diagnoses and all orders for this visit:    Anxiety    Hepatic adenoma    Right upper quadrant pain    Health examination in population survey  -     Comprehensive metabolic panel; Future  -     Hemoglobin A1C; Future  -     Lipid Panel with Direct LDL reflex; Future  -     CBC and differential; Future  -     TSH, 3rd generation with Free T4 reflex; Future          Subjective:       Patient ID: Katarina Amado is a 27 y o  female  Danita Chowdhury is here today for follow up  She is doing well  She finished her first triathlon this weekend! She had covid in June  She had mild symptoms  Her cough lingered for a few weeks but has since resolved  She continues to have intermittent RUQ pain  She denies n/v/d  She is due of MRI abdomen in September  She had no relief with pantoprazole  She was advised by GI to stop her birth control but after discussing with her GYN she has decided to continue  The following portions of the patient's history were reviewed and updated as appropriate: allergies, current medications, past family history, past medical history, past social history, past surgical history and problem list     Review of Systems   Constitutional: Negative for activity change and appetite change  Respiratory: Negative for cough and shortness of breath  Cardiovascular: Negative for chest pain, palpitations and leg swelling  Gastrointestinal: Positive for abdominal pain  Negative for blood in stool, constipation, diarrhea, nausea and vomiting  Genitourinary: Negative for difficulty urinating  Musculoskeletal: Negative for arthralgias  Neurological: Negative for dizziness, light-headedness and headaches     Psychiatric/Behavioral: Negative for decreased concentration, dysphoric mood and sleep disturbance  The patient is nervous/anxious  Objective:      /70   Pulse 70   Temp 97 8 °F (36 6 °C)   Ht 5' 8" (1 727 m)   Wt 120 kg (264 lb 6 4 oz)   SpO2 98%   BMI 40 20 kg/m²          Physical Exam  Vitals reviewed  Constitutional:       Appearance: Normal appearance  HENT:      Head: Normocephalic and atraumatic  Eyes:      Conjunctiva/sclera: Conjunctivae normal    Cardiovascular:      Rate and Rhythm: Normal rate and regular rhythm  Heart sounds: Normal heart sounds  Pulmonary:      Effort: Pulmonary effort is normal       Breath sounds: Normal breath sounds  Neurological:      Mental Status: She is alert and oriented to person, place, and time     Psychiatric:         Mood and Affect: Mood normal          Behavior: Behavior normal

## 2022-08-11 ENCOUNTER — APPOINTMENT (OUTPATIENT)
Dept: LAB | Facility: HOSPITAL | Age: 27
End: 2022-08-11
Payer: COMMERCIAL

## 2022-08-11 DIAGNOSIS — Z00.8 HEALTH EXAMINATION IN POPULATION SURVEY: ICD-10-CM

## 2022-08-11 LAB
ALBUMIN SERPL BCP-MCNC: 3 G/DL (ref 3.5–5)
ALP SERPL-CCNC: 72 U/L (ref 46–116)
ALT SERPL W P-5'-P-CCNC: 35 U/L (ref 12–78)
ANION GAP SERPL CALCULATED.3IONS-SCNC: 3 MMOL/L (ref 4–13)
AST SERPL W P-5'-P-CCNC: 27 U/L (ref 5–45)
BASOPHILS # BLD AUTO: 0.13 THOUSANDS/ΜL (ref 0–0.1)
BASOPHILS NFR BLD AUTO: 2 % (ref 0–1)
BILIRUB SERPL-MCNC: 0.3 MG/DL (ref 0.2–1)
BUN SERPL-MCNC: 13 MG/DL (ref 5–25)
CALCIUM ALBUM COR SERPL-MCNC: 9.8 MG/DL (ref 8.3–10.1)
CALCIUM SERPL-MCNC: 9 MG/DL (ref 8.3–10.1)
CHLORIDE SERPL-SCNC: 107 MMOL/L (ref 96–108)
CHOLEST SERPL-MCNC: 161 MG/DL
CO2 SERPL-SCNC: 26 MMOL/L (ref 21–32)
CREAT SERPL-MCNC: 0.83 MG/DL (ref 0.6–1.3)
EOSINOPHIL # BLD AUTO: 0.45 THOUSAND/ΜL (ref 0–0.61)
EOSINOPHIL NFR BLD AUTO: 6 % (ref 0–6)
ERYTHROCYTE [DISTWIDTH] IN BLOOD BY AUTOMATED COUNT: 13.5 % (ref 11.6–15.1)
EST. AVERAGE GLUCOSE BLD GHB EST-MCNC: 131 MG/DL
GFR SERPL CREATININE-BSD FRML MDRD: 96 ML/MIN/1.73SQ M
GLUCOSE P FAST SERPL-MCNC: 127 MG/DL (ref 65–99)
HBA1C MFR BLD: 6.2 %
HCT VFR BLD AUTO: 37.8 % (ref 34.8–46.1)
HDLC SERPL-MCNC: 50 MG/DL
HGB BLD-MCNC: 12 G/DL (ref 11.5–15.4)
IMM GRANULOCYTES # BLD AUTO: 0.02 THOUSAND/UL (ref 0–0.2)
IMM GRANULOCYTES NFR BLD AUTO: 0 % (ref 0–2)
LDLC SERPL CALC-MCNC: 68 MG/DL (ref 0–100)
LYMPHOCYTES # BLD AUTO: 2.36 THOUSANDS/ΜL (ref 0.6–4.47)
LYMPHOCYTES NFR BLD AUTO: 32 % (ref 14–44)
MCH RBC QN AUTO: 28 PG (ref 26.8–34.3)
MCHC RBC AUTO-ENTMCNC: 31.7 G/DL (ref 31.4–37.4)
MCV RBC AUTO: 88 FL (ref 82–98)
MONOCYTES # BLD AUTO: 0.62 THOUSAND/ΜL (ref 0.17–1.22)
MONOCYTES NFR BLD AUTO: 8 % (ref 4–12)
NEUTROPHILS # BLD AUTO: 3.81 THOUSANDS/ΜL (ref 1.85–7.62)
NEUTS SEG NFR BLD AUTO: 52 % (ref 43–75)
NRBC BLD AUTO-RTO: 0 /100 WBCS
PLATELET # BLD AUTO: 377 THOUSANDS/UL (ref 149–390)
PMV BLD AUTO: 9 FL (ref 8.9–12.7)
POTASSIUM SERPL-SCNC: 4.3 MMOL/L (ref 3.5–5.3)
PROT SERPL-MCNC: 7.6 G/DL (ref 6.4–8.4)
RBC # BLD AUTO: 4.28 MILLION/UL (ref 3.81–5.12)
SODIUM SERPL-SCNC: 136 MMOL/L (ref 135–147)
TRIGL SERPL-MCNC: 213 MG/DL
TSH SERPL DL<=0.05 MIU/L-ACNC: 4.26 UIU/ML (ref 0.45–4.5)
WBC # BLD AUTO: 7.39 THOUSAND/UL (ref 4.31–10.16)

## 2022-08-11 PROCEDURE — 85025 COMPLETE CBC W/AUTO DIFF WBC: CPT

## 2022-08-11 PROCEDURE — 84443 ASSAY THYROID STIM HORMONE: CPT

## 2022-08-11 PROCEDURE — 36415 COLL VENOUS BLD VENIPUNCTURE: CPT

## 2022-08-11 PROCEDURE — 80053 COMPREHEN METABOLIC PANEL: CPT

## 2022-08-11 PROCEDURE — 83036 HEMOGLOBIN GLYCOSYLATED A1C: CPT

## 2022-08-11 PROCEDURE — 80061 LIPID PANEL: CPT

## 2022-09-08 ENCOUNTER — OFFICE VISIT (OUTPATIENT)
Dept: PODIATRY | Facility: CLINIC | Age: 27
End: 2022-09-08
Payer: COMMERCIAL

## 2022-09-08 VITALS — WEIGHT: 264 LBS | BODY MASS INDEX: 40.01 KG/M2 | HEIGHT: 68 IN

## 2022-09-08 DIAGNOSIS — L60.0 INGROWING NAIL: Primary | ICD-10-CM

## 2022-09-08 PROCEDURE — 99213 OFFICE O/P EST LOW 20 MIN: CPT | Performed by: PODIATRIST

## 2022-09-08 PROCEDURE — 11750 EXCISION NAIL&NAIL MATRIX: CPT | Performed by: PODIATRIST

## 2022-09-08 RX ORDER — LIDOCAINE HYDROCHLORIDE 10 MG/ML
5 INJECTION, SOLUTION INFILTRATION; PERINEURAL ONCE
Status: COMPLETED | OUTPATIENT
Start: 2022-09-08 | End: 2022-09-08

## 2022-09-08 RX ORDER — CEPHALEXIN 500 MG/1
500 CAPSULE ORAL EVERY 6 HOURS SCHEDULED
Qty: 20 CAPSULE | Refills: 0 | Status: SHIPPED | OUTPATIENT
Start: 2022-09-08 | End: 2022-09-13

## 2022-09-08 RX ADMIN — LIDOCAINE HYDROCHLORIDE 5 ML: 10 INJECTION, SOLUTION INFILTRATION; PERINEURAL at 10:13

## 2022-09-08 NOTE — PATIENT INSTRUCTIONS
POST-OPERATIVE INSTRUCTIONS FOLLOWING NAIL REMOVAL    TODAY  Leave dressing intact  Rest  Take ibuprofen or tylenol as needed  TOMORROW AM  Remove the dressing, if it sticks, get it wet with water  Soak the toe in warm water for 10 minutes, you may add epsom salts if you want  Dry toe, apply small amount of neosporin or bacitracin to the nail bed with a bandaid  TOMORROW PM  Remove the dressing, if it sticks, get it wet with water  Soak the toe in warm water for 10 minutes, you may add epsom salts if you want  Dry toe, apply small amount of neosporin or bacitracin to the nail bed with a bandaid  NEXT 3 weeks  Repeat steps 2 and 3  After 3 weeks you can stop applying neosporin  Just clean the toe daily with soap and water and cover with a bandaid  Once all drainage stops, you can stop placing bandaid on the toe      You may notice some redness on the side of the nail bed  This is ok  Some minor bleeding or clear-yellowish drainage is normal  If your toe begins to drain thick, white creamy drainage (pus) call immediately  If you notice redness streaking onto the foot and ankle, call our office immediately  Infections are rare after these procedures but we may have to prescribe an antibiotic

## 2022-09-08 NOTE — PROGRESS NOTES
Assessment/Plan:      Diagnoses and all orders for this visit:    Ingrowing nail  -     lidocaine (XYLOCAINE) 1 % injection 5 mL  -     Nail removal  -     cephalexin (KEFLEX) 500 mg capsule; Take 1 capsule (500 mg total) by mouth every 6 (six) hours for 5 days        Diagnosis and options discussed with patient  Patient agreeable to the plan as stated below  Postop instructions given  Check in 2-3 weeks  Antibiotic prescribed in case of infection  Signs and symptoms discussed  Nail removal    Date/Time: 9/8/2022 10:12 AM  Performed by: Bouchra Agustin DPM  Authorized by: Bouchra Agustin DPM     Patient location:  ClinicUniversal Protocol:  Consent: Verbal consent obtained  Risks and benefits: risks, benefits and alternatives were discussed  Consent given by: patient  Time out: Immediately prior to procedure a "time out" was called to verify the correct patient, procedure, equipment, support staff and site/side marked as required  Timeout called at: 9/8/2022 10:12 AM   Patient understanding: patient states understanding of the procedure being performed  Patient identity confirmed: verbally with patient      Location:     Foot:  R big toe  Pre-procedure details:     Skin preparation:  Betadine  Anesthesia (see MAR for exact dosages): Anesthesia method:  Local infiltration    Local anesthetic:  Lidocaine 1% w/o epi  Nail Removal:     Nail removed:  Partial    Nail side:  Medial  Ingrown nail:     Nail matrix removed or ablated:  Partial  Post-procedure details:     Dressing:  4x4 sterile gauze, antibiotic ointment and gauze roll    Patient tolerance of procedure: Tolerated well, no immediate complications        Subjective:     Patient ID: Lana Owens is a 27 y o  female  Patient has chronic ingrowing nail to her right medial great toe  She did have it removed years ago but it never really grew and right  It was infected a few weeks ago    She was able to get rid the acute infection and drain it but it is starting to hurt again  Review of Systems   Constitutional: Negative  Gastrointestinal: Negative for diarrhea, nausea and vomiting  Musculoskeletal: Positive for arthralgias  Skin:        Painful ingrown nail   Neurological: Negative for numbness  Objective:     Physical Exam  Vitals reviewed  Constitutional:       Appearance: She is obese  She is not ill-appearing or diaphoretic  Cardiovascular:      Rate and Rhythm: Normal rate  Pulses: Normal pulses  Dorsalis pedis pulses are 2+ on the right side  Posterior tibial pulses are 2+ on the right side  Musculoskeletal:      Right foot: Normal range of motion  Feet:      Right foot:      Protective Sensation: 10 sites tested  10 sites sensed  Skin integrity: Skin breakdown present  No erythema  Toenail Condition: Right toenails are ingrown  Neurological:      Mental Status: She is alert

## 2022-09-12 ENCOUNTER — HOSPITAL ENCOUNTER (OUTPATIENT)
Dept: MRI IMAGING | Facility: HOSPITAL | Age: 27
Discharge: HOME | End: 2022-09-12
Attending: INTERNAL MEDICINE
Payer: COMMERCIAL

## 2022-09-12 DIAGNOSIS — D13.4 HEPATIC ADENOMA: ICD-10-CM

## 2022-09-12 PROCEDURE — A9581 GADOXETATE DISODIUM INJ: HCPCS | Performed by: INTERNAL MEDICINE

## 2022-09-12 PROCEDURE — 74183 MRI ABD W/O CNTR FLWD CNTR: CPT

## 2022-09-12 RX ADMIN — GADOXETATE DISODIUM 12 ML: 181.43 INJECTION, SOLUTION INTRAVENOUS at 18:45

## 2022-09-19 DIAGNOSIS — F41.9 ANXIETY: ICD-10-CM

## 2022-09-19 RX ORDER — ESCITALOPRAM OXALATE 10 MG/1
10 TABLET ORAL DAILY
Qty: 90 TABLET | Refills: 1 | Status: SHIPPED | OUTPATIENT
Start: 2022-09-19

## 2022-09-26 DIAGNOSIS — D13.4 HEPATIC ADENOMA: Primary | ICD-10-CM

## 2023-01-09 ENCOUNTER — OFFICE VISIT (OUTPATIENT)
Dept: INTERNAL MEDICINE CLINIC | Facility: CLINIC | Age: 28
End: 2023-01-09

## 2023-01-09 VITALS
OXYGEN SATURATION: 99 % | HEART RATE: 92 BPM | WEIGHT: 272 LBS | TEMPERATURE: 97.6 F | DIASTOLIC BLOOD PRESSURE: 80 MMHG | SYSTOLIC BLOOD PRESSURE: 120 MMHG | BODY MASS INDEX: 41.22 KG/M2 | HEIGHT: 68 IN

## 2023-01-09 DIAGNOSIS — D13.4 HEPATIC ADENOMA: ICD-10-CM

## 2023-01-09 DIAGNOSIS — F41.9 ANXIETY: Primary | ICD-10-CM

## 2023-01-09 DIAGNOSIS — E66.01 MORBID OBESITY (HCC): ICD-10-CM

## 2023-01-09 DIAGNOSIS — T75.3XXA MOTION SICKNESS, INITIAL ENCOUNTER: ICD-10-CM

## 2023-01-09 PROBLEM — R10.11 RUQ PAIN: Status: ACTIVE | Noted: 2022-01-11

## 2023-01-09 RX ORDER — SCOLOPAMINE TRANSDERMAL SYSTEM 1 MG/1
1 PATCH, EXTENDED RELEASE TRANSDERMAL
Qty: 4 PATCH | Refills: 0 | Status: SHIPPED | OUTPATIENT
Start: 2023-01-09

## 2023-01-09 NOTE — ASSESSMENT & PLAN NOTE
Doing well  Decrease lexapro to 5 mg daily per patient request    Advised to call with an update in 1 month

## 2023-01-09 NOTE — PROGRESS NOTES
Name: Katarina Amado      : 1995      MRN: [id number]  Encounter Provider: PRETTY Cronin  Encounter Date: 2023   Encounter department:  [facility]     1  Anxiety  Assessment & Plan:  Doing well  Decrease lexapro to 5 mg daily per patient request    Advised to call with an update in 1 month  Orders:  -     TSH, 3rd generation with Free T4 reflex; Future    2  Hepatic adenoma  Assessment & Plan:  Repeat MRI in march  Off estrogen  Encouraged weight loss of 20-25 lbs  Sees GI    Orders:  -     Comprehensive metabolic panel; Future  -     CBC and differential; Future    3  Motion sickness, initial encounter  -     scopolamine (TRANSDERM-SCOP) 1 mg/3 days TD 72 hr patch; Place 1 patch on the skin every third day    4  Morbid obesity ([address] )  -     Hemoglobin A1C; Future  -     Lipid Panel with Direct LDL reflex; Future         Subjective      Danita Chowdhury is here today for follow up  She is doing very well  She'd like to decrease her lexapro dose and see how she feels  She was recently taken off birth control which she thinks may help her moods  She continues to have intermittent RUQ pain  She is to follow up with another MRI of her liver in March  She is requesting something to help her with motion sickness  She has a trip to [place] coming up  Review of Systems   Constitutional: Negative for activity change, appetite change and fatigue  Respiratory: Negative for shortness of breath  Cardiovascular: Negative for chest pain  Gastrointestinal: Positive for abdominal pain  Negative for constipation, diarrhea, nausea and vomiting  Genitourinary: Negative for difficulty urinating and dysuria  Neurological: Negative for dizziness, light-headedness and headaches  Psychiatric/Behavioral: Negative for dysphoric mood and sleep disturbance  The patient is not nervous/anxious          Current Outpatient Medications on File Prior to Visit   Medication Sig   • escitalopram (LEXAPRO) 10 mg tablet Take 1 tablet (10 mg total) by mouth daily   • fluocinonide (LIDEX) 0 05 % external solution Apply topically 2 (two) times a day   • ketoconazole (NIZORAL) 2 % shampoo Daily for 2 weeks straight and then on "Mondays, Wednesdays and Fridays":  Lather into scalp; leave on for 5 minutes and then rinse off completely  • misoprostol (CYTOTEC) 200 mcg tablet Two tablets per vagina two hours prior to procedure   • norethindrone-ethinyl estradiol-ferrous fumarate (Lomedia 24 FE) 1-20 MG-MCG(24) per tablet Take 1 tablet by mouth daily       Objective     /80   Pulse 92   Temp 97 6 °F (36 4 °C)   Ht 5' 8" (1 727 m)   Wt 123 kg (272 lb)   SpO2 99%   BMI 41 36 kg/m²     Physical Exam  Vitals reviewed  Constitutional:       Appearance: Normal appearance  HENT:      Head: Normocephalic and atraumatic  Eyes:      Conjunctiva/sclera: Conjunctivae normal    Cardiovascular:      Rate and Rhythm: Normal rate  Heart sounds: Normal heart sounds  Pulmonary:      Effort: Pulmonary effort is normal       Breath sounds: Normal breath sounds  Abdominal:      General: Bowel sounds are normal       Palpations: Abdomen is soft  Tenderness: There is abdominal tenderness in the right upper quadrant  Skin:     General: Skin is warm and dry  Neurological:      Mental Status: She is alert and oriented to person, place, and time     Psychiatric:         Mood and Affect: Mood normal          Behavior: Behavior normal        PRETTY Diaz

## 2023-01-13 ENCOUNTER — TELEPHONE (OUTPATIENT)
Dept: INTERNAL MEDICINE CLINIC | Facility: CLINIC | Age: 28
End: 2023-01-13

## 2023-01-13 ENCOUNTER — APPOINTMENT (EMERGENCY)
Dept: RADIOLOGY | Facility: HOSPITAL | Age: 28
End: 2023-01-13

## 2023-01-13 ENCOUNTER — HOSPITAL ENCOUNTER (EMERGENCY)
Facility: HOSPITAL | Age: 28
Discharge: HOME | End: 2023-01-13
Attending: EMERGENCY MEDICINE

## 2023-01-13 VITALS
RESPIRATION RATE: 16 BRPM | OXYGEN SATURATION: 97 % | TEMPERATURE: 98.7 F | SYSTOLIC BLOOD PRESSURE: 147 MMHG | DIASTOLIC BLOOD PRESSURE: 83 MMHG | HEART RATE: 111 BPM

## 2023-01-13 DIAGNOSIS — R55 NEAR SYNCOPE: Primary | ICD-10-CM

## 2023-01-13 DIAGNOSIS — D13.4 HEPATIC ADENOMA: ICD-10-CM

## 2023-01-13 DIAGNOSIS — K76.0 HEPATIC STEATOSIS: ICD-10-CM

## 2023-01-13 LAB
ALBUMIN SERPL BCP-MCNC: 3.7 G/DL (ref 3.5–5)
ALP SERPL-CCNC: 88 U/L (ref 46–116)
ALT SERPL W P-5'-P-CCNC: 48 U/L (ref 12–78)
ANION GAP SERPL CALCULATED.3IONS-SCNC: 7 MMOL/L (ref 4–13)
AST SERPL W P-5'-P-CCNC: 21 U/L (ref 5–45)
ATRIAL RATE: 95 BPM
BASOPHILS # BLD AUTO: 0.12 THOUSANDS/ÂΜL (ref 0–0.1)
BASOPHILS NFR BLD AUTO: 1 % (ref 0–1)
BILIRUB SERPL-MCNC: 0.24 MG/DL (ref 0.2–1)
BUN SERPL-MCNC: 12 MG/DL (ref 5–25)
CALCIUM SERPL-MCNC: 9.7 MG/DL (ref 8.3–10.1)
CHLORIDE SERPL-SCNC: 107 MMOL/L (ref 96–108)
CO2 SERPL-SCNC: 25 MMOL/L (ref 21–32)
CREAT SERPL-MCNC: 0.81 MG/DL (ref 0.6–1.3)
EOSINOPHIL # BLD AUTO: 0.26 THOUSAND/ÂΜL (ref 0–0.61)
EOSINOPHIL NFR BLD AUTO: 3 % (ref 0–6)
ERYTHROCYTE [DISTWIDTH] IN BLOOD BY AUTOMATED COUNT: 12.6 % (ref 11.6–15.1)
EXT PREGNANCY TEST URINE: NEGATIVE
EXT. CONTROL: NORMAL
GFR SERPL CREATININE-BSD FRML MDRD: 98 ML/MIN/1.73SQ M
GLUCOSE SERPL-MCNC: 168 MG/DL (ref 65–140)
HCT VFR BLD AUTO: 37.1 % (ref 34.8–46.1)
HGB BLD-MCNC: 12.5 G/DL (ref 11.5–15.4)
IMM GRANULOCYTES # BLD AUTO: 0.02 THOUSAND/UL (ref 0–0.2)
IMM GRANULOCYTES NFR BLD AUTO: 0 % (ref 0–2)
LYMPHOCYTES # BLD AUTO: 1.98 THOUSANDS/ÂΜL (ref 0.6–4.47)
LYMPHOCYTES NFR BLD AUTO: 23 % (ref 14–44)
MCH RBC QN AUTO: 28.3 PG (ref 26.8–34.3)
MCHC RBC AUTO-ENTMCNC: 33.7 G/DL (ref 31.4–37.4)
MCV RBC AUTO: 84 FL (ref 82–98)
MONOCYTES # BLD AUTO: 0.62 THOUSAND/ÂΜL (ref 0.17–1.22)
MONOCYTES NFR BLD AUTO: 7 % (ref 4–12)
NEUTROPHILS # BLD AUTO: 5.73 THOUSANDS/ÂΜL (ref 1.85–7.62)
NEUTS SEG NFR BLD AUTO: 66 % (ref 43–75)
NRBC BLD AUTO-RTO: 0 /100 WBCS
P AXIS: 63 DEGREES
PLATELET # BLD AUTO: 380 THOUSANDS/UL (ref 149–390)
PMV BLD AUTO: 8.9 FL (ref 8.9–12.7)
POTASSIUM SERPL-SCNC: 3.7 MMOL/L (ref 3.5–5.3)
PR INTERVAL: 154 MS
PROT SERPL-MCNC: 8.5 G/DL (ref 6.4–8.4)
QRS AXIS: 41 DEGREES
QRSD INTERVAL: 76 MS
QT INTERVAL: 326 MS
QTC INTERVAL: 409 MS
RBC # BLD AUTO: 4.41 MILLION/UL (ref 3.81–5.12)
SODIUM SERPL-SCNC: 139 MMOL/L (ref 135–147)
T WAVE AXIS: 28 DEGREES
VENTRICULAR RATE: 95 BPM
WBC # BLD AUTO: 8.73 THOUSAND/UL (ref 4.31–10.16)

## 2023-01-13 RX ADMIN — SODIUM CHLORIDE 1000 ML: 0.9 INJECTION, SOLUTION INTRAVENOUS at 14:24

## 2023-01-13 NOTE — ED ATTENDING ATTESTATION
1/13/2023  I, Aurora Almazan MD, saw and evaluated the patient  I have discussed the patient with the resident/non-physician practitioner and agree with the resident's/non-physician practitioner's findings, Plan of Care, and MDM as documented in the resident's/non-physician practitioner's note, except where noted  All available labs and Radiology studies were reviewed  I was present for key portions of any procedure(s) performed by the resident/non-physician practitioner and I was immediately available to provide assistance  At this point I agree with the current assessment done in the Emergency Department    I have conducted an independent evaluation of this patient a history and physical is as follows:  Pt was at work and had episode of lightheadedness Pt did not loc Pt had some associated nausea and mild sob for seconds no cp Pt had this resolve on won and has had some intermittent lightheadedness since Pt has a benign tumor In liver and it was uncomfortable today PE: alert nad heart reg lungs clear abd soft tender ruq MDM: will do us labs fluids preg test  ED Course         Critical Care Time  Procedures

## 2023-01-13 NOTE — ED PROVIDER NOTES
History  Chief Complaint   Patient presents with   • Dizziness     Pt reports dizziness that comes and goes since this morning, no LOC  States she "feels off"     27-year-old female with a history of hepatic adenoma and anxiety who presents due to a near syncopal episode while at work around 1130 this morning  The patient states that she works here and was upstairs talking to a coworker when she began to feel lightheaded  She additionally states that she had a difficult time hearing what her coworker said and felt like they were standing far away from prior  The patient denies loss of consciousness or fall  The patient states that with the episode she felt some mild shortness of breath that has since resolved  The patient also experienced some nausea but did not vomit  The patient states that since then she has intermittently felt lightheaded and feels generally " off"  The patient also complains of worsening of her right upper quadrant abdominal pain secondary to her hepatic adenomas, and is concerned about the possibility of bleeding  The patient states that she used to be on OCPs but currently uses condoms for contraception  The patient denies fever, chills, URI symptoms, chest pain, palpitation, abdominal pain, vaginal bleeding, vaginal discharge, swelling or pain in her lower extremities  Prior to Admission Medications   Prescriptions Last Dose Informant Patient Reported? Taking?   escitalopram (LEXAPRO) 10 mg tablet   No No   Sig: Take 1 tablet (10 mg total) by mouth daily   fluocinonide (LIDEX) 0 05 % external solution   No No   Sig: Apply topically 2 (two) times a day   ketoconazole (NIZORAL) 2 % shampoo   No No   Sig: Daily for 2 weeks straight and then on "Mondays, Wednesdays and Fridays":  Lather into scalp; leave on for 5 minutes and then rinse off completely     misoprostol (CYTOTEC) 200 mcg tablet   No No   Sig: Two tablets per vagina two hours prior to procedure norethindrone-ethinyl estradiol-ferrous fumarate (Lomedia 24 FE) 1-20 MG-MCG(24) per tablet   No No   Sig: Take 1 tablet by mouth daily   scopolamine (TRANSDERM-SCOP) 1 mg/3 days TD 72 hr patch   No No   Sig: Place 1 patch on the skin every third day      Facility-Administered Medications: None       Past Medical History:   Diagnosis Date   • Allergic    • Anxiety    • Known health problems: none        History reviewed  No pertinent surgical history  Family History   Problem Relation Age of Onset   • Hyperthyroidism Mother         when pregnant    • No Known Problems Father    • Cancer Maternal Grandmother         uterine    • Cancer Maternal Aunt         melanoma   • Diabetes Paternal Grandmother    • Alcohol abuse Neg Hx    • Substance Abuse Neg Hx    • Mental illness Neg Hx    • Depression Neg Hx      I have reviewed and agree with the history as documented  E-Cigarette/Vaping   • E-Cigarette Use Never User      E-Cigarette/Vaping Substances   • Nicotine No    • THC No    • CBD No    • Flavoring No    • Other No    • Unknown No      Social History     Tobacco Use   • Smoking status: Never   • Smokeless tobacco: Never   Vaping Use   • Vaping Use: Never used   Substance Use Topics   • Alcohol use: Yes     Comment: Social    • Drug use: No        Review of Systems   Constitutional: Negative for chills, diaphoresis and fever  HENT: Negative for congestion and sore throat  Eyes: Negative for pain and redness  Respiratory: Negative for cough and shortness of breath  Cardiovascular: Negative for chest pain, palpitations and leg swelling  Gastrointestinal: Positive for nausea  Negative for abdominal pain, diarrhea and vomiting  Genitourinary: Negative for dysuria, flank pain, hematuria, vaginal bleeding and vaginal discharge  Musculoskeletal: Negative for arthralgias and myalgias  Skin: Negative for color change, pallor and rash  Neurological: Positive for light-headedness   Negative for dizziness, tremors, seizures, syncope, facial asymmetry, speech difficulty, weakness, numbness and headaches  Near syncope  All other systems reviewed and are negative  Physical Exam  ED Triage Vitals   Temperature Pulse Respirations Blood Pressure SpO2   01/13/23 1336 01/13/23 1336 01/13/23 1336 01/13/23 1336 01/13/23 1336   98 7 °F (37 1 °C) (!) 111 18 154/89 98 %      Temp Source Heart Rate Source Patient Position - Orthostatic VS BP Location FiO2 (%)   01/13/23 1336 01/13/23 1415 01/13/23 1717 01/13/23 1717 --   Oral Monitor Sitting Right arm       Pain Score       01/13/23 1336       No Pain             Orthostatic Vital Signs  Vitals:    01/13/23 1336 01/13/23 1415 01/13/23 1717   BP: 154/89 143/75 147/83   Pulse: (!) 111 98 (!) 111   Patient Position - Orthostatic VS:   Sitting       Physical Exam  Vitals and nursing note reviewed  Constitutional:       General: She is not in acute distress  Appearance: Normal appearance  She is not ill-appearing, toxic-appearing or diaphoretic  HENT:      Head: Normocephalic and atraumatic  Nose: Nose normal  No congestion or rhinorrhea  Mouth/Throat:      Mouth: Mucous membranes are moist       Pharynx: Oropharynx is clear  No oropharyngeal exudate or posterior oropharyngeal erythema  Eyes:      General: No scleral icterus  Right eye: No discharge  Left eye: No discharge  Extraocular Movements: Extraocular movements intact  Conjunctiva/sclera: Conjunctivae normal       Pupils: Pupils are equal, round, and reactive to light  Cardiovascular:      Rate and Rhythm: Regular rhythm  Tachycardia present  Pulses: Normal pulses  Heart sounds: Normal heart sounds  No murmur heard  No friction rub  No gallop  Pulmonary:      Effort: Pulmonary effort is normal       Breath sounds: Normal breath sounds  No wheezing, rhonchi or rales  Abdominal:      General: Abdomen is flat  Palpations: Abdomen is soft  Tenderness: There is no abdominal tenderness  There is no right CVA tenderness, left CVA tenderness, guarding or rebound  Musculoskeletal:         General: No swelling, tenderness, deformity or signs of injury  Normal range of motion  Cervical back: Normal range of motion and neck supple  No rigidity or tenderness  Right lower leg: No edema  Left lower leg: No edema  Lymphadenopathy:      Cervical: No cervical adenopathy  Skin:     General: Skin is warm and dry  Capillary Refill: Capillary refill takes less than 2 seconds  Coloration: Skin is not jaundiced or pale  Findings: No bruising, erythema, lesion or rash  Neurological:      General: No focal deficit present  Mental Status: She is alert and oriented to person, place, and time  Sensory: No sensory deficit  Motor: No weakness           ED Medications  Medications   sodium chloride 0 9 % bolus 1,000 mL (0 mL Intravenous Stopped 1/13/23 1600)       Diagnostic Studies  Results Reviewed     Procedure Component Value Units Date/Time    Comprehensive metabolic panel [[id number]]  (Abnormal) Collected: 01/13/23 1424    Lab Status: Final result Specimen: Blood from Arm, Right Updated: 01/13/23 1525     Sodium 139 mmol/L      Potassium 3 7 mmol/L      Chloride 107 mmol/L      CO2 25 mmol/L      ANION GAP 7 mmol/L      BUN 12 mg/dL      Creatinine 0 81 mg/dL      Glucose 168 mg/dL      Calcium 9 7 mg/dL      AST 21 U/L      ALT 48 U/L      Alkaline Phosphatase 88 U/L      Total Protein 8 5 g/dL      Albumin 3 7 g/dL      Total Bilirubin 0 24 mg/dL      eGFR 98 ml/min/1 73sq m     Narrative:      [place] guidelines for Chronic Kidney Disease (CKD):   •  Stage 1 with normal or high GFR (GFR > 90 mL/min/1 73 square meters)  •  Stage 2 Mild CKD (GFR = 60-89 mL/min/1 73 square meters)  •  Stage 3A Moderate CKD (GFR = 45-59 mL/min/1 73 square meters)  •  Stage 3B Moderate CKD (GFR = 30-44 mL/min/1 73 square meters)  •  Stage 4 Severe CKD (GFR = 15-29 mL/min/1 73 square meters)  •  Stage 5 End Stage CKD (GFR <15 mL/min/1 73 square meters)  Note: GFR calculation is accurate only with a steady state creatinine    CBC and differential [[id number]]  (Abnormal) Collected: 01/13/23 1424    Lab Status: Final result Specimen: Blood from Arm, Right Updated: 01/13/23 1447     WBC 8 73 Thousand/uL      RBC 4 41 Million/uL      Hemoglobin 12 5 g/dL      Hematocrit 37 1 %      MCV 84 fL      MCH 28 3 pg      MCHC 33 7 g/dL      RDW 12 6 %      MPV 8 9 fL      Platelets 371 Thousands/uL      nRBC 0 /100 WBCs      Neutrophils Relative 66 %      Immat GRANS % 0 %      Lymphocytes Relative 23 %      Monocytes Relative 7 %      Eosinophils Relative 3 %      Basophils Relative 1 %      Neutrophils Absolute 5 73 Thousands/µL      Immature Grans Absolute 0 02 Thousand/uL      Lymphocytes Absolute 1 98 Thousands/µL      Monocytes Absolute 0 62 Thousand/µL      Eosinophils Absolute 0 26 Thousand/µL      Basophils Absolute 0 12 Thousands/µL     POCT pregnancy, urine [[id number]]  (Normal) Resulted: 01/13/23 1425    Lab Status: Final result Updated: 01/13/23 1425     EXT Preg Test, Ur Negative     Control Valid                 US right upper quadrant   Final Result by Franchesca Pavon MD (01/13 1913)      Hepatomegaly and hepatic steatosis  Poor visualization of multiple hepatic masses seen on prior exams secondary to hepatic adenomyomatosis  Workstation performed: [id number]               Procedures  Procedures      ED Course                             SBIRT 22yo+    Flowsheet Row Most Recent Value   SBIRT (25 yo +)    In order to provide better care to our patients, we are screening all of our patients for alcohol and drug use  Would it be okay to ask you these screening questions? Yes Filed at: 01/13/2023 1427   Initial Alcohol Screen: US AUDIT-C     1   How often do you have a drink containing alcohol? 0 Filed at: 01/13/2023 1427   2  How many drinks containing alcohol do you have on a typical day you are drinking? 0 Filed at: 01/13/2023 1427   3a  Male UNDER 65: How often do you have five or more drinks on one occasion? 0 Filed at: 01/13/2023 1427   3b  FEMALE Any Age, or MALE 65+: How often do you have 4 or more drinks on one occassion? 0 Filed at: 01/13/2023 1427   Audit-C Score 0 Filed at: 01/13/2023 1427   MALENA: How many times in the past year have you    Used an illegal drug or used a prescription medication for non-medical reasons? Never Filed at: 01/13/2023 1427                Medical Decision Making  39-year-old female with a history of hepatic adenoma and anxiety who presents due to a near syncopal episode while at work around 1130 this morning  The patient also complains of right upper quadrant abdominal pain secondary to hepatic adenoma  The patient is tachycardic at 111  Remainder the patient's vitals are within the normal limits  On exam the patient is well-appearing, no focal neurologic deficits, heart is regular rate and rhythm, lungs are clear to auscultation bilaterally, abdomen is soft with mild right upper quadrant tenderness but no rebound or guarding, no lower extremity edema or swelling  Suspect benign cause of the patient's near syncopal episode  Will order EKG, CBC, CMP, urine pregnancy, IV fluids, and right upper quadrant abdominal ultrasound  EKG rate 95, sinus rhythm, normal axis, normal intervals, no delta waves, no ST elevation or depression  Urine pregnancy is negative  CBC and CMP do not show any actionable derangements  The patient's tachycardia improved somewhat with IV fluids  Right upper quadrant abdominal ultrasound is read as "Hepatomegaly and hepatic steatosis  Poor visualization of multiple hepatic masses seen on prior exams secondary to hepatic adenomyomatosis "  Patient is currently asymptomatic  Patient is instructed to follow-up with her primary care provider  Return precautions are given and the patient is discharged  Hepatic adenoma: self-limited or minor problem  Hepatic steatosis: acute illness or injury  Near syncope: acute illness or injury  Amount and/or Complexity of Data Reviewed  Labs: ordered  Radiology: ordered  Disposition  Final diagnoses:   Near syncope   Hepatic adenoma   Hepatic steatosis     Time reflects when diagnosis was documented in both MDM as applicable and the Disposition within this note     Time User Action Codes Description Comment    1/13/2023  7:16 PM Sara Griderns A Add [R55] Near syncope     1/13/2023  7:16 PM Sara Griderns A Add [D13 4] Hepatic adenoma     1/13/2023  7:16 PM Kiki Crockett Add [K76 0] Hepatic steatosis       ED Disposition     ED Disposition   Discharge    Condition   Stable    Date/Time   Fri Jan 13, 2023  7:16 PM    Comment   Vonda Grimes discharge to home/self care  Follow-up Information     Follow up With Specialties Details Why Contact Info Additional [facility] [address] [place] [address], [address] [place] [address] Internal Medicine, Nurse Practitioner Schedule an appointment as soon as possible for a visit in 1 week  Sebastien Leavitt [address]    [place] [address] Emergency Department Emergency Medicine Go to  If symptoms worsen [address] [phone]  [address] Emergency Department, [address] [place] [address], [address]          Discharge Medication List as of 1/13/2023  7:21 PM      CONTINUE these medications which have NOT CHANGED    Details   escitalopram (LEXAPRO) 10 mg tablet Take 1 tablet (10 mg total) by mouth daily, Starting Mon 9/19/2022, Normal      fluocinonide (LIDEX) 0 05 % external solution Apply topically 2 (two) times a day, Starting Wed 12/8/2021, Normal      ketoconazole (NIZORAL) 2 % shampoo Daily for 2 weeks straight and then on "Mondays, Wednesdays and Fridays":  Lather into scalp; leave on for 5 minutes and then rinse off completely  , Normal      misoprostol (CYTOTEC) 200 mcg tablet Two tablets per vagina two hours prior to procedure, Normal      norethindrone-ethinyl estradiol-ferrous fumarate (Lomedia 24 FE) 1-20 MG-MCG(24) per tablet Take 1 tablet by mouth daily, Starting Tue 2/1/2022, Normal      scopolamine (TRANSDERM-SCOP) 1 mg/3 days TD 72 hr patch Place 1 patch on the skin every third day, Starting Mon 1/9/2023, Normal           No discharge procedures on file  PDMP Review       Value Time User    PDMP Reviewed  Yes 12/23/2020  4:34 PM Michelle Aragon DO           ED Provider  Attending physically available and evaluated Luis E Wayne  I managed the patient along with the ED Attending      Electronically Signed by         Naa Smalls DO  01/13/23 8776

## 2023-01-13 NOTE — TELEPHONE ENCOUNTER
PCP not available  We can just monitor your symptoms for now, I am not sure if it is due to the Scopolamine patch  Did you decrease your Lexapro also recently?

## 2023-01-13 NOTE — TELEPHONE ENCOUNTER
Pt  had been wearing the Scopolamine patch on Weds  night into Thursday morning  She was feeling weird so she took it off  Hasn't had one on since then  This morning she got a headache and was having trouble hearing, felt almost like she was going to pass out  Is just a little shaky now

## 2023-01-14 NOTE — DISCHARGE INSTRUCTIONS
You were seen in the emergency department for near syncope  Your EKG and blood work did not show any concerning findings  Your ultrasound not show any evidence of bleeding from the adenomas in your liver  Follow-up with your primary care provider  If your symptoms return please return to the emergency department

## 2023-01-18 ENCOUNTER — TELEPHONE (OUTPATIENT)
Dept: INTERNAL MEDICINE CLINIC | Facility: CLINIC | Age: 28
End: 2023-01-18

## 2023-01-18 NOTE — TELEPHONE ENCOUNTER
I saw she was in the ER  How is she feeling? Have her symptoms improved?            Discharge Notification     Patient: Lazara Ivy  : 1995 (27 yrs)  No data recorded  PCP: Lorena Kilgore [address] [place] [address]  Attending:  Cuauhtemoc Bosch MD  [address] [place] [address], Unit: [place] [facility]  Admission Date: 2023  ER Presenting complaint:  Syncope  Admitting Diagnosis: Dizziness [R42]

## 2023-06-07 ENCOUNTER — HOSPITAL ENCOUNTER (OUTPATIENT)
Dept: RADIOLOGY | Facility: HOSPITAL | Age: 28
Discharge: HOME | End: 2023-06-07
Attending: INTERNAL MEDICINE
Payer: COMMERCIAL

## 2023-06-07 DIAGNOSIS — D13.4 HEPATIC ADENOMA: ICD-10-CM

## 2023-06-07 PROCEDURE — A9585 GADOBUTROL INJECTION: HCPCS | Performed by: INTERNAL MEDICINE

## 2023-06-07 PROCEDURE — 74183 MRI ABD W/O CNTR FLWD CNTR: CPT

## 2023-06-07 RX ADMIN — GADOBUTROL 12 ML: 604.72 INJECTION INTRAVENOUS at 08:57

## 2023-06-16 DIAGNOSIS — D13.4 HEPATIC ADENOMA: Primary | ICD-10-CM

## 2023-06-16 NOTE — RESULT ENCOUNTER NOTE
Yfn Antunez, your recent MRI again reveals hepatic adenomas with no significant change over the past several years    I will order repeat MRI for 1 year from now